# Patient Record
Sex: FEMALE | Race: BLACK OR AFRICAN AMERICAN | Employment: FULL TIME | URBAN - METROPOLITAN AREA
[De-identification: names, ages, dates, MRNs, and addresses within clinical notes are randomized per-mention and may not be internally consistent; named-entity substitution may affect disease eponyms.]

---

## 2017-03-06 ENCOUNTER — HOSPITAL ENCOUNTER (OUTPATIENT)
Dept: PHYSICAL THERAPY | Age: 47
Discharge: HOME | End: 2017-03-06
Payer: SELF-PAY

## 2017-03-06 PROCEDURE — 97161 PT EVAL LOW COMPLEX 20 MIN: CPT

## 2017-03-06 PROCEDURE — 97110 THERAPEUTIC EXERCISES: CPT
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
## 2017-03-08 ENCOUNTER — HOSPITAL ENCOUNTER (OUTPATIENT)
Dept: PHYSICAL THERAPY | Age: 47
Discharge: HOME | End: 2017-03-08
Payer: SELF-PAY

## 2017-03-08 PROCEDURE — 97110 THERAPEUTIC EXERCISES: CPT
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
## 2017-03-13 ENCOUNTER — HOSPITAL ENCOUNTER (OUTPATIENT)
Dept: PHYSICAL THERAPY | Age: 47
Discharge: HOME | End: 2017-03-13
Payer: SELF-PAY

## 2017-03-13 PROCEDURE — 97140 MANUAL THERAPY 1/> REGIONS: CPT

## 2017-03-13 PROCEDURE — 97110 THERAPEUTIC EXERCISES: CPT

## 2017-03-21 ENCOUNTER — HOSPITAL ENCOUNTER (OUTPATIENT)
Dept: PHYSICAL THERAPY | Age: 47
Discharge: HOME | End: 2017-03-21
Payer: SELF-PAY

## 2017-03-21 PROCEDURE — 97140 MANUAL THERAPY 1/> REGIONS: CPT

## 2017-03-21 PROCEDURE — 97110 THERAPEUTIC EXERCISES: CPT

## 2017-03-21 NOTE — PROGRESS NOTES
[facility]  [address], [address] [facility]    OUTPATIENT physical Therapy DAILY Treatment NOTe  Visit: 4    NAME: Georgia Vivas AGE: 40 y.o. GENDER: female  DATE: 3/21/2017  REFERRING PHYSICIAN: Brown Tuttle., *        GOALS  Short term goals  Time frame: 3 weeks  1. Patient will be compliance and independent with the initial HEP as evidenced by being able to perform without cuing. 2. Patient will report a 50% improvement in symptoms. 3. Patient report a 50% improvement in sleeping. 7. Patient will tolerate 15 minutes of clinic activities before onset of symptoms. Long term goals  Time frame: 6 weeks  1. Patient will reports pain level decreased to 2/10 to allow increased ease of movement. 2. Patient will have an improved score on the TXU Jose D outcome measure by 12 points to demonstrate an increase in functional activity tolerance. 3. Patient will be independent in final individualized HEP. 4. Patient will sleep 6-8 hours without being interrupted by pain. SUBJECTIVE:   Pt reports decreased pain, c/o of \"soreness\" neck and upper back      OBJECTIVE DATA SUMMARY:     EXAMINATION/PRESENTATION/DECISION MAKING:   Pain:  Location:Low back, b/l shoulders and neck  Quality: aching and sharp  Now:8/10  Best:5/10  Worst:  Factors that improve pain: medication:  rest lying down    Posture:   Rounded shoulders head forward    Strength:    WNL    Range of Motion:   Cx   Flexion and ext limited to 50% of normal  Rotation 75% of normal and painful    Lumbar ROM  Limited to 50% in all directions, painful    Left G-H  WNL except ER 65 degrees    Right G-H  Flexion 130 degrees  IR 45 degrees  ER 55 degrees    Spinal Assessment:   Flattened lumbar spine      Joint Mobility:   Tender with pressure over the lumbar and thoracic spinous processes    Palpation:   TTP in lumbar and thoracic paraspinals, over the SIJ b/l'ly,  UT, rhomboids and scapular muscles. Neurologic Assessment:   Tone: normal   Sensation: WNL   Reflexes: not tested    Special Tests:   +Spurling  - LE NTT    Mobility:   Transitional Movements: Antalgic    Gait: normal    Balance:  normal    Functional Measure: In compliance with CMSs Claims Based Outcome Reporting, the following G-code set was chosen for this patient based on their primary functional limitation being treated: The outcome measure chosen to determine the severity of the functional limitation was the TXU Jose D with a score of 17/24 which was correlated with the impairment scale.     ? Mobility - Walking and Moving Around:     - CURRENT STATUS: CL - 60%-79% impaired, limited or restricted    - GOAL STATUS: CI - 1%-19% impaired, limited or restricted    - D/C STATUS:  CH - 0% impaired, limited or restricted  ---------------To be determined---------------      Physical Therapy Evaluation Charge Determination   History Examination Presentation Decision-Making            Based on the above components, the patient evaluation is determined to be of the following complexity level: LOW     TREATMENT/INTERVENTION:  Modalities (Rationale): MHP low back and neck post treatment to decrease pain and muscle guarding      Therapeutic Exercises:  HEP    UT Stretch in sitting  Shoulder blade squeeze  PPT  KTC  LTR  Cane flexion exercises in supine    New HEP 3/21    Addition clinic Activities    LBE/UBE  X 5 minutes  Resistance of 3.5    Supine  KTC  Bridges x10 2 sets  T's and X's 10 of each  PPT  Chin tuck into pillow    Standing  Row with red TB x 10 reps  Corner stretch    Sitting  Flexion with blue theraball  Segmental Flexion/ ext over chair  Shoulder blade squeeze  Scapular protraction stretch    All 4's  Angry cat  UE extension  Child's pose        Manual Therapy:  Cx distraction Gr 2  Side glide, hypomobile painful,  lower Cx spine  STM SCM, B/L UT  STM rhomboids      Neuro Re-Education:  Discussed sitting with lumbar support in straight back chair    Balance Training:  none    Ambulation/Gait Training:  none    Activity tolerance and post treatment pain report: Tolerated fair, pain in left ankle    Education:  Education was provided to the patient on the following topics: Pt to visit ER or see Dr Brigette Sharma about increasing pain in left ankle. [x]    No changes were made to the home exercise program.  []    The following changes were made to the home exercise program:   Patient verbalized understanding of the topics presented. ASSESSMENT:   Pt reports decreased pain in her ankle and low back, cc is neck and UT area. .  C/o soreness in the UT and shoulder blade area b/l. Tolerated manual therapy today, hypomobile and painful with Cx side glides. Distraction ok. Progress exercises, clinic activities and manual therapy. New HEP given today to focus on neck and upper back. Patients progression toward goals is as follows:  []     Improving appropriately and progressing toward goals  [x]     Improving slowly and progressing toward goals  []     Not making progress toward goals and plan of care will be adjusted    PLAN OF CARE:   Patient continues to benefit from skilled intervention to address the above impairments. [x]    Continue treatment per established plan of care. []     Recommend the following changes to the plan of care:     Recommendations/Intent for next treatment: Progress LBE/UBE, stretching and core exercises, encourage walking program. Continue mob and strengthening of thoracic spin. Try TM Review new HEP    Alvin Portillo PT     Patient Time in clinic:

## 2017-03-30 ENCOUNTER — HOSPITAL ENCOUNTER (OUTPATIENT)
Dept: PHYSICAL THERAPY | Age: 47
Discharge: HOME | End: 2017-03-30
Payer: SELF-PAY

## 2017-03-30 PROCEDURE — 97110 THERAPEUTIC EXERCISES: CPT | Performed by: PHYSICAL THERAPIST

## 2017-03-30 PROCEDURE — 97140 MANUAL THERAPY 1/> REGIONS: CPT | Performed by: PHYSICAL THERAPIST

## 2017-03-30 NOTE — PROGRESS NOTES
[facility]  [address], [address] [facility]    OUTPATIENT physical Therapy DAILY Treatment NOTe  Visit: 5    NAME: Georgia Vivas AGE: 40 y.o. GENDER: female  DATE: 3/30/2017  REFERRING PHYSICIAN: Augusta Ochoa, *      GOALS  Short term goals  Time frame: 3 weeks  1. Patient will be compliant and independent with the initial HEP as evidenced by being able to perform without cueing. 2. Patient will report a 50% improvement in symptoms. 3. Patient report a 50% improvement in sleeping. 7. Patient will tolerate 15 minutes of clinic activities before onset of symptoms. Long term goals  Time frame: 6 weeks  1. Patient will report pain level decrease to 2/10 to allow increased ease of movement. 2. Patient will have an improved score on the TXU Jose D outcome measure by 12 points to demonstrate an increase in functional activity tolerance. 3. Patient will be independent in final individualized HEP. 4. Patient will sleep 6-8 hours without being interrupted by pain. SUBJECTIVE:   \"It hurts most when I'm trying to go to sleep. \"    Pain in: 8/10 neck and low back     OBJECTIVE DATA SUMMARY:   Objective data from initial evaluation:  EXAMINATION/PRESENTATION/DECISION MAKING:   Pain:  Location:Low back, b/l shoulders and neck  Quality: aching and sharp  Now:8/10  Best:5/10  Factors that improve pain: medication:  rest lying down    Posture:   Rounded shoulders head forward    Strength:    WNL    Range of Motion:   Cx   Flexion and ext limited to 50% of normal  Rotation 75% of normal and painful    Lumbar ROM  Limited to 50% in all directions, painful    Left G-H  WNL except ER 65 degrees    Right G-H  Flexion 130 degrees  IR 45 degrees  ER 55 degrees    Spinal Assessment:   Flattened lumbar spine    Joint Mobility:   Tender with pressure over the lumbar and thoracic spinous processes    Palpation:   TTP in lumbar and thoracic paraspinals, over the SIJ b/l'ly, UT, rhomboids and scapular muscles. Special Tests:   +Spurling  - LE NTT    Functional Measure:   Elijah [id number]    TREATMENT/INTERVENTION:  Modalities (Rationale): to decrease pain and muscle guarding  MHP low back and neck post treatment -held this session    Therapeutic Exercises:  HEP provided on evaluation:  UT Stretch, Shoulder blade squeeze. PPT  KTC. LTR. Cane flexion exercises in supine    New HEP provided on 3/21/17    Addition clinic Activities:    LBE/UBE  X 5 minutes  Resistance of 3.5    Supine  KTC  LTR  Bridges x10 2 sets  T's and X's 10 of each  PPT  Chin tuck into pillow    Standing  Row with red TB x 10 reps  Corner stretch    Sitting  UT stretch: 3 reps with 15 second holds  Scalene stretch: 3 reps with 15 second holds  Levator scapulae stretch: 3 reps with 15 second holds  Lower cervical/upper thoracic stretch (scap protraction): 3 reps with 15 second holds  Biceps/chest stretch: 3 reps with 15 second holds  Flexion with blue theraball  Segmental Flexion/ ext over chair  Shoulder blade squeeze: 15 reps    All 4's  Angry cat  UE extension  Child's pose    Manual Therapy:  Cx distraction Gr 2  Side glide, hypomobile painful,  lower Cx spine  Manual cervical traction with suboccipital release  Trigger point release to UT and rhomboids  Cervico-thoracic distraction in standing; cavitation, pain relief     Neuro Re-Education:  Discussed sitting with lumbar support in straight back chair    Activity tolerance and post treatment pain report: Tolerated fair; 7/10    Education:  Education was provided to the patient on the following topics  [x]    No changes were made to the home exercise program.  []    The following changes were made to the home exercise program:   Patient verbalized understanding of the topics presented. ASSESSMENT:   Patient continues to present with pain in neck and mid back. Tightness noted throughout UT, rhomboids, suboccipitals, scalenes, and levator scapulae. Patient reports frequent headaches. Hypomobility noted throughout cervical and upper thoracic spine. Patient tolerated cervico-thoracic distraction well with cavitation noted. Patient tolerated trigger point release to UT and rhomboids well; instructed to properly hydrate once home and apply heat. Fair tolerance to HEP. Patients progression toward goals is as follows:  []     Improving appropriately and progressing toward goals  [x]     Improving slowly and progressing toward goals  []     Not making progress toward goals and plan of care will be adjusted    PLAN OF CARE:   Patient continues to benefit from skilled intervention to address the above impairments. [x]    Continue treatment per established plan of care.   []     Recommend the following changes to the plan of care:     Recommendations/Intent for next treatment: Progress LBE/UBE, stretching and core exercises, continue mob and strengthening of thoracic spine    Jc Martinez, PT   Time Calculation: 30 mins  Patient Time in clinic:   Start Time: 2855   Stop Time: 7663

## 2017-04-04 ENCOUNTER — APPOINTMENT (OUTPATIENT)
Dept: PHYSICAL THERAPY | Age: 47
End: 2017-04-04

## 2017-04-06 ENCOUNTER — APPOINTMENT (OUTPATIENT)
Dept: PHYSICAL THERAPY | Age: 47
End: 2017-04-06

## 2017-04-06 ENCOUNTER — HOSPITAL ENCOUNTER (OUTPATIENT)
Dept: PHYSICAL THERAPY | Age: 47
Discharge: HOME | End: 2017-04-06
Payer: SELF-PAY

## 2017-04-06 PROCEDURE — 97140 MANUAL THERAPY 1/> REGIONS: CPT | Performed by: PHYSICAL THERAPIST

## 2017-04-06 PROCEDURE — 97014 ELECTRIC STIMULATION THERAPY: CPT | Performed by: PHYSICAL THERAPIST

## 2017-04-06 PROCEDURE — 97110 THERAPEUTIC EXERCISES: CPT | Performed by: PHYSICAL THERAPIST

## 2017-04-06 NOTE — PROGRESS NOTES
[facility]  [address], [address] [facility]    OUTPATIENT physical Therapy DAILY Treatment NOTe  Visit: 6    NAME: Georgia Vivas AGE: 40 y.o. GENDER: female  DATE: 4/6/2017  REFERRING PHYSICIAN: Usman Loving., *      GOALS  Short term goals  Time frame: 3 weeks  1. Patient will be compliant and independent with the initial HEP as evidenced by being able to perform without cueing. MET  2. Patient will report a 50% improvement in symptoms. 3. Patient report a 50% improvement in sleeping. 7. Patient will tolerate 15 minutes of clinic activities before onset of symptoms. Long term goals  Time frame: 6 weeks  1. Patient will report pain level decrease to 2/10 to allow increased ease of movement. 2. Patient will have an improved score on the TXU Jose D outcome measure by 12 points to demonstrate an increase in functional activity tolerance. 3. Patient will be independent in final individualized HEP. 4. Patient will sleep 6-8 hours without being interrupted by pain. SUBJECTIVE:   \"It feels a little better than it did. \"    Pain in: 7/10 neck and low back     OBJECTIVE DATA SUMMARY:   Objective data from initial evaluation:  EXAMINATION/PRESENTATION/DECISION MAKING:   Pain:  Location:Low back, b/l shoulders and neck  Quality: aching and sharp  Now:8/10  Best:5/10  Factors that improve pain: medication:  rest lying down    Posture:   Rounded shoulders head forward    Strength:    WNL    Range of Motion:   Cx   Flexion and ext limited to 50% of normal  Rotation 75% of normal and painful    Lumbar ROM  Limited to 50% in all directions, painful    Left G-H  WNL except ER 65 degrees    Right G-H  Flexion 130 degrees  IR 45 degrees  ER 55 degrees    Spinal Assessment:   Flattened lumbar spine    Joint Mobility:   Tender with pressure over the lumbar and thoracic spinous processes    Palpation:   TTP in lumbar and thoracic paraspinals, over the SIJ b/l'ly,  UT, rhomboids and scapular muscles. Special Tests:   +Spurling  - LE NTT    Functional Measure:   Rella Katos: 17/24    TREATMENT/INTERVENTION:  Modalities (Rationale): to decrease pain and muscle guarding  MHP and IFC e stim to UT and rhomboids in supine for 15 minutes post treatment     Therapeutic Exercises:  HEP provided on evaluation:  UT Stretch, Shoulder blade squeeze. PPT  KTC. LTR. Cane flexion exercises in supine    New HEP provided on 3/21/17    Addition clinic Activities:    LBE/UBE  X 5 minutes  Resistance of 3.5    Supine  KTC  LTR  Bridges x10 2 sets  T's and X's 10 of each  PPT  Chin tuck into pillow    Standing  Rows at Quantum 25#:  2 sets of 12 reps  Pull downs at Quantum 20#: 2 sets of 12 rep  Corner stretch: 4 reps with 15 second holds    Sitting  UT stretch: 3 reps with 15 second holds  Scalene stretch: 3 reps with 15 second holds  Levator scapulae stretch: 3 reps with 15 second holds  Lower cervical/upper thoracic stretch (scap protraction): 3 reps with 15 second holds  Biceps/chest stretch: 3 reps with 15 second holds  Flexion with blue theraball  Segmental Flexion/ ext over chair  Shoulder shrugs: 20 reps    All 4's  Angry cat  UE extension  Child's pose    Manual Therapy:  Cx distraction Gr 2  Side glide, hypomobile painful,  lower Cx spine  Manual cervical traction with suboccipital release  Trigger point release to UT and rhomboids  Cervico-thoracic distraction in standing; pain relief     Neuro Re-Education:  Discussed sitting with lumbar support in straight back chair    Activity tolerance and post treatment pain report:   Good: 5/10    Education:  Education was provided to the patient on the following topics  [x]    No changes were made to the home exercise program.  []    The following changes were made to the home exercise program:   Patient verbalized understanding of the topics presented. ASSESSMENT:   Patient presents with decreased cervical spine pain this date.  Tightness noted throughout UT, rhomboids, suboccipitals, scalenes, and levator scapulae. Patient reports about one headache per week; noting decrease in frequency and intensity. Hypomobility noted throughout cervical and upper thoracic spine. Patient tolerated trigger point release to UT and rhomboids well. Focused placed on cervical and upper thoracic spine this date as patient reports that is where majority of pain is located. Patients progression toward goals is as follows:  []     Improving appropriately and progressing toward goals  [x]     Improving slowly and progressing toward goals  []     Not making progress toward goals and plan of care will be adjusted    PLAN OF CARE:   Patient continues to benefit from skilled intervention to address the above impairments. [x]    Continue treatment per established plan of care.   []     Recommend the following changes to the plan of care:     Recommendations/Intent for next treatment: Progress LBE/UBE, stretching and core exercises, continue mob and strengthening of thoracic spine    Everette Catalan PT   Time Calculation: 58 mins  Patient Time in clinic:   Start Time: 0903   Stop Time: 1001

## 2017-04-13 ENCOUNTER — HOSPITAL ENCOUNTER (OUTPATIENT)
Dept: PHYSICAL THERAPY | Age: 47
Discharge: HOME | End: 2017-04-13
Payer: SELF-PAY

## 2017-04-13 NOTE — PROGRESS NOTES
[facility]  [address], [address] [facility] ([place], [place])    OUTPATIENT physical Therapy      4/13/2017:  Lorenzo Jean-Baptiste was not seen on this date for physical therapy for the following reasons:    [x]     Patient called to cancel the visit for the following reasons: allergies   []     Patient missed the visit and did not call to cancel.     Maldonado Plaza, PT

## 2017-04-18 ENCOUNTER — HOSPITAL ENCOUNTER (OUTPATIENT)
Dept: PHYSICAL THERAPY | Age: 47
Discharge: HOME | End: 2017-04-18
Payer: SELF-PAY

## 2017-04-18 NOTE — PROGRESS NOTES
[facility]  [address], [address] [facility]    OUTPATIENT physical Therapy      4/18/2017:  Conner Wilson was not seen on this date for physical therapy for the following reasons:    [x]     Patient called to cancel the visit for the following reasons: patient moving; rescheduled for next week   []     Patient missed the visit and did not call to cancel.     Margarette Vallecillo, PT

## 2017-05-10 ENCOUNTER — HOSPITAL ENCOUNTER (OUTPATIENT)
Dept: PHYSICAL THERAPY | Age: 47
Discharge: HOME | End: 2017-05-10

## 2017-05-10 NOTE — PROGRESS NOTES
[facility]  [address], [address] [facility]    OUTPATIENT physical Therapy discharge note      5/10/2017:  Patient will be discharged from physical therapy at this time. Criteria for termination of care:    []           Patient has plateaued  [x]           Patient has not returned to therapy  []           Patient has missed three or more visits without prior notification  [x]           Other: Patient called to cancel session today. Patient requested discharged from physical therapy. Patient was seen for 6 visits from 3/6/17 to 4/6/17. Please refer to the most recent progress note for the latest PT info available. If you need anything further faxed to you, please contact us at [phone].     Thank you for this referral.  Yesy Meza, PT

## 2021-09-09 ENCOUNTER — HOSPITAL ENCOUNTER (EMERGENCY)
Age: 51
Discharge: HOME | End: 2021-09-09
Attending: EMERGENCY MEDICINE

## 2021-09-09 VITALS
SYSTOLIC BLOOD PRESSURE: 121 MMHG | WEIGHT: 117.5 LBS | DIASTOLIC BLOOD PRESSURE: 97 MMHG | OXYGEN SATURATION: 98 % | HEIGHT: 63 IN | HEART RATE: 94 BPM | TEMPERATURE: 97.7 F | RESPIRATION RATE: 20 BRPM | BODY MASS INDEX: 20.82 KG/M2
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
## 2021-09-09 DIAGNOSIS — S20.211A CONTUSION OF RIB ON RIGHT SIDE, INITIAL ENCOUNTER: Primary | ICD-10-CM

## 2021-09-09 PROCEDURE — 99282 EMERGENCY DEPT VISIT SF MDM: CPT
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
## 2021-09-09 NOTE — ED NOTES
Emergency Department Nursing Plan of Care       The Nursing Plan of Care is developed from the Nursing assessment and Emergency Department Attending provider initial evaluation. The plan of care may be reviewed in the ED Provider note.     The Plan of Care was developed with the following considerations:   Patient / Family readiness to learn indicated by:verbalized understanding  Persons(s) to be included in education: patient  Barriers to Learning/Limitations:No    [address] [facility] [place]    9/9/2021   4:49 PM
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
## 2021-10-28 ENCOUNTER — HOSPITAL ENCOUNTER (EMERGENCY)
Age: 51
Discharge: HOME | End: 2021-10-28
Attending: EMERGENCY MEDICINE

## 2021-10-28 VITALS
WEIGHT: 120 LBS | BODY MASS INDEX: 21.26 KG/M2 | RESPIRATION RATE: 18 BRPM | HEIGHT: 63 IN | OXYGEN SATURATION: 99 % | DIASTOLIC BLOOD PRESSURE: 92 MMHG | TEMPERATURE: 98.2 F | HEART RATE: 60 BPM | SYSTOLIC BLOOD PRESSURE: 145 MMHG

## 2021-10-28 DIAGNOSIS — L42 PITYRIASIS ROSEA: Primary | ICD-10-CM

## 2021-10-28 DIAGNOSIS — R03.0 ELEVATED BLOOD PRESSURE READING: ICD-10-CM

## 2021-10-28 PROCEDURE — 74011250637 HC RX REV CODE- 250/637: Performed by: PHYSICIAN ASSISTANT

## 2021-10-28 PROCEDURE — 99283 EMERGENCY DEPT VISIT LOW MDM: CPT

## 2021-10-28 RX ORDER — DIPHENHYDRAMINE HCL 25 MG
50 CAPSULE ORAL
Status: COMPLETED | OUTPATIENT
Start: 2021-10-28 | End: 2021-10-28

## 2021-10-28 RX ORDER — DIPHENHYDRAMINE HCL 25 MG
50 CAPSULE ORAL
Qty: 20 CAPSULE | Refills: 0 | Status: SHIPPED | OUTPATIENT
Start: 2021-10-28 | End: 2021-11-07

## 2021-10-28 RX ORDER — TRIAMCINOLONE ACETONIDE 1 MG/ML
LOTION TOPICAL 3 TIMES DAILY
Qty: 60 ML | Refills: 0 | Status: SHIPPED | OUTPATIENT
Start: 2021-10-28

## 2021-10-28 RX ADMIN — DIPHENHYDRAMINE HYDROCHLORIDE 50 MG: 25 CAPSULE ORAL at 10:25

## 2021-10-28 NOTE — ED PROVIDER NOTES
EMERGENCY DEPARTMENT HISTORY AND PHYSICAL EXAM      Date: 10/28/2021  Patient Name: Shelley Alvarado    History of Presenting Illness     Chief Complaint   Patient presents with    Skin Problem     History Provided By: Patient    HPI: Shelley Alvarado, 49 y.o. female with no chronic medical history who presents via self to the ED with cc of acute moderate pruritic rash to generalized body X 1 month. Endorses symptoms started after she moved into a new house and she was concerned about mold exposure. She denies any fever, chills, nausea, vomiting, numbness, tingling, focal weakness, chest pain, shortness of breath, wheezing, cough, headaches, lightheadedness, dizziness. No medications or modifying factors. Denies any new medications or other new environmental exposures. No new cosmetics, detergents, lotions. No recent sick contacts or recent URI symptoms. PCP: None    There are no other complaints, changes, or physical findings at this time. No current facility-administered medications on file prior to encounter. No current outpatient medications on file prior to encounter. Past History     Past Medical History:  History reviewed. No pertinent past medical history. Past Surgical History:  History reviewed. No pertinent surgical history. Family History:  History reviewed. No pertinent family history. Social History:  Social History     Tobacco Use    Smoking status: Never Smoker    Smokeless tobacco: Never Used   Substance Use Topics    Alcohol use: Never    Drug use: Never     Allergies:  No Known Allergies  Review of Systems   Review of Systems   Constitutional: Negative. Negative for activity change, chills, fatigue and fever. HENT: Negative. Negative for congestion, ear pain, rhinorrhea and sore throat. Eyes: Negative. Negative for pain. Respiratory: Negative. Negative for cough, chest tightness, shortness of breath and wheezing. Cardiovascular: Negative. Negative for chest pain, palpitations and leg swelling. Gastrointestinal: Negative. Negative for abdominal pain, diarrhea, nausea and vomiting. Genitourinary: Negative. Negative for difficulty urinating and dysuria. Musculoskeletal: Negative. Negative for arthralgias and joint swelling. Skin: Positive for rash. Negative for wound. Neurological: Negative. Negative for weakness and headaches. Psychiatric/Behavioral: Negative. Negative for confusion. Physical Exam   Physical Exam  Vitals and nursing note reviewed. Constitutional:       General: She is not in acute distress. Appearance: Normal appearance. She is well-developed. She is not ill-appearing, toxic-appearing or diaphoretic. HENT:      Head: Normocephalic and atraumatic. Right Ear: Hearing and external ear normal.      Left Ear: Hearing and external ear normal.      Nose: Nose normal.   Eyes:      Conjunctiva/sclera: Conjunctivae normal.      Pupils: Pupils are equal, round, and reactive to light. Cardiovascular:      Rate and Rhythm: Normal rate and regular rhythm. Pulmonary:      Effort: Pulmonary effort is normal. No respiratory distress. Breath sounds: Normal breath sounds. Musculoskeletal:         General: Normal range of motion. Cervical back: Normal range of motion. Skin:     General: Skin is warm and dry. Findings: Rash present. No abscess or erythema. Rash is macular. Rash is not crusting, nodular, papular, pustular, scaling, urticarial or vesicular. Comments: Intermittent 1 to 2 cm dry macule rash noted to trunk and bilateral extremities. No palm/webspace involvement or papular rash. Neurological:      Mental Status: She is alert and oriented to person, place, and time. Psychiatric:         Mood and Affect: Mood is anxious. Behavior: Behavior normal.         Thought Content:  Thought content normal.         Judgment: Judgment normal.       Diagnostic Study Results   Labs -   No results found for this or any previous visit (from the past 12 hour(s)). Radiologic Studies -   No orders to display     No results found. Medical Decision Making   I am the first provider for this patient. I reviewed the vital signs, available nursing notes, past medical history, past surgical history, family history and social history. Vital Signs-Reviewed the patient's vital signs. Patient Vitals for the past 24 hrs:   Temp Pulse Resp BP SpO2   10/28/21 1021 -- -- -- -- 99 %   10/28/21 1019 -- 60 -- (!) 145/92 --   10/28/21 1013 98.2 °F (36.8 °C) 62 18 (!) 182/95 100 %     Pulse Oximetry Analysis - 99% on RA (normal)    Records Reviewed: Nursing Notes, Old Medical Records, Previous Radiology Studies and Previous Laboratory Studies    Provider Notes (Medical Decision Making):   Patient presents with rash. DDx: viral exanthem, pityriasis rosea, allergic reaction, contact dermatitis, staph infection, scabies, bedbugs. Will treat with benadryl, topical steroids. ED Course:   Initial assessment performed. The patients presenting problems have been discussed, and they are in agreement with the care plan formulated and outlined with them. I have encouraged them to ask questions as they arise throughout their visit. Progress Note:   Updated pt on all returned results and findings. Discussed the importance of proper follow up as referred below along with return precautions. Pt in agreement with the care plan and expresses agreement with and understanding of all items discussed. Disposition:  10:33 AM  I have discussed with patient their diagnosis, treatment, and follow up plan. The patient agrees to follow up as outlined in discharge paperwork and also to return to the ED with any worsening. Elinor Lam PA-C      PLAN:  1.    Current Discharge Medication List      START taking these medications    Details   diphenhydrAMINE (BenadryL) 25 mg capsule Take 2 Capsules by mouth every six (6) hours as needed for Itching or Skin Irritation for up to 10 days. Qty: 20 Capsule, Refills: 0  Start date: 10/28/2021, End date: 11/7/2021      triamcinolone (KENALOG) 0.1 % lotion Apply  to affected area three (3) times daily. use thin layer  Qty: 60 mL, Refills: 0  Start date: 10/28/2021           2. Follow-up Information     Follow up With Specialties Details Why [address] EMERGENCY DEPT Emergency Medicine Go to  As needed, If symptoms worsen [address] [facility]  Schedule an appointment as soon as possible for a visit  As needed, If symptoms worsen [place]  [phone]        Return to ED if worse     Diagnosis     Clinical Impression:   1. Pityriasis rosea    2. Elevated blood pressure reading            Please note that this dictation was completed with Dragon, computer voice recognition software. Quite often unanticipated grammatical, syntax, homophones, and other interpretive errors are inadvertently transcribed by the computer software. Please disregard these errors. Additionally, please excuse any errors that have escaped final proofreading.

## 2021-10-28 NOTE — DISCHARGE INSTRUCTIONS
It was a pleasure taking care of you at [facility] Emergency Department today. We know that when you come to [facility], you are entrusting us with your health, comfort, and safety. Our physicians and nurses honor that trust, and we truly appreciate the opportunity to care for you and your loved ones. We also value our feedback. If you receive a survey about your Emergency Department experience today, please fill it out. We care about our patients' feedback, and we listen to what you have to say. Thank you!

## 2021-10-28 NOTE — ED TRIAGE NOTES
Patient presents to the ED with c/o skin problem x3 months. Pt reports mold and living in an apartment and is unsure what is causing this rash to her back and legs. Pt reports itching.
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
## 2022-04-10 ENCOUNTER — HOSPITAL ENCOUNTER (EMERGENCY)
Age: 52
Discharge: HOME | End: 2022-04-10
Attending: EMERGENCY MEDICINE

## 2022-04-10 VITALS
OXYGEN SATURATION: 100 % | HEART RATE: 61 BPM | HEIGHT: 63 IN | TEMPERATURE: 98.3 F | RESPIRATION RATE: 18 BRPM | SYSTOLIC BLOOD PRESSURE: 125 MMHG | WEIGHT: 120 LBS | BODY MASS INDEX: 21.26 KG/M2 | DIASTOLIC BLOOD PRESSURE: 64 MMHG

## 2022-04-10 DIAGNOSIS — N92.0 MENORRHAGIA WITH REGULAR CYCLE: ICD-10-CM

## 2022-04-10 DIAGNOSIS — J30.89 ENVIRONMENTAL AND SEASONAL ALLERGIES: Primary | ICD-10-CM

## 2022-04-10 PROCEDURE — 99282 EMERGENCY DEPT VISIT SF MDM: CPT

## 2022-04-10 NOTE — ED NOTES
Pt given discharge and follow up instructions. No further questions at this time. Pt advised to follow with PCP.

## 2022-04-10 NOTE — ED PROVIDER NOTES
HPI   Patient reports 1 to 2 days of seasonal allergy symptoms. These include rhinorrhea, sneezing, and sinus pain and pressure. She also reports history of uterine fibroids and is on her period and is experiencing \"clogs. \"  No pelvic pain, syncope, fever. No past medical history on file. No past surgical history on file. No family history on file. Social History     Socioeconomic History    Marital status: SINGLE     Spouse name: Not on file    Number of children: Not on file    Years of education: Not on file    Highest education level: Not on file   Occupational History    Not on file   Tobacco Use    Smoking status: Never Smoker    Smokeless tobacco: Never Used   Substance and Sexual Activity    Alcohol use: Never    Drug use: Never    Sexual activity: Never   Other Topics Concern    Not on file   Social History Narrative    Not on file     Social Determinants of Health     Financial Resource Strain:     Difficulty of Paying Living Expenses: Not on file   Food Insecurity:     Worried About Running Out of Food in the Last Year: Not on file    Kavita of Food in the Last Year: Not on file   Transportation Needs:     Lack of Transportation (Medical): Not on file    Lack of Transportation (Non-Medical):  Not on file   Physical Activity:     Days of Exercise per Week: Not on file    Minutes of Exercise per Session: Not on file   Stress:     Feeling of Stress : Not on file   Social Connections:     Frequency of Communication with Friends and Family: Not on file    Frequency of Social Gatherings with Friends and Family: Not on file    Attends Oriental orthodox Services: Not on file    Active Member of Clubs or Organizations: Not on file    Attends Club or Organization Meetings: Not on file    Marital Status: Not on file   Intimate Partner Violence:     Fear of Current or Ex-Partner: Not on file    Emotionally Abused: Not on file    Physically Abused: Not on file    Sexually Abused: Not on file   Housing Stability:     Unable to Pay for Housing in the Last Year: Not on file    Number of Places Lived in the Last Year: Not on file    Unstable Housing in the Last Year: Not on file         ALLERGIES: Patient has no known allergies. Review of Systems   Constitutional: Negative. HENT: Positive for congestion, postnasal drip, rhinorrhea, sinus pressure, sinus pain and sneezing. Eyes: Positive for itching. Respiratory: Negative. Cardiovascular: Negative. Gastrointestinal: Negative. Endocrine: Negative. Genitourinary: Positive for vaginal bleeding. Musculoskeletal: Negative. Allergic/Immunologic: Negative. Neurological: Negative. Hematological: Negative. Psychiatric/Behavioral: Negative. All other systems reviewed and are negative. Vitals:    04/10/22 0917   BP: 125/64   Pulse: 61   Resp: 18   Temp: 98.3 °F (36.8 °C)   SpO2: 100%   Weight: 54.4 kg (120 lb)   Height: 5' 3\" (1.6 m)            Physical Exam  Vitals and nursing note reviewed. Constitutional:       General: She is not in acute distress. Appearance: Normal appearance. She is normal weight. She is not ill-appearing, toxic-appearing or diaphoretic. HENT:      Head: Normocephalic and atraumatic. Nose: Congestion and rhinorrhea present. Mouth/Throat:      Mouth: Mucous membranes are moist.      Pharynx: Oropharynx is clear. No oropharyngeal exudate or posterior oropharyngeal erythema. Eyes:      Extraocular Movements: Extraocular movements intact. Conjunctiva/sclera: Conjunctivae normal.      Pupils: Pupils are equal, round, and reactive to light. Cardiovascular:      Pulses: Normal pulses. Heart sounds: No murmur heard. Pulmonary:      Effort: Pulmonary effort is normal. No respiratory distress. Breath sounds: Normal breath sounds. No stridor. No wheezing, rhonchi or rales.    Musculoskeletal:         General: No swelling, tenderness, deformity or signs of injury. Normal range of motion. Cervical back: Normal range of motion. No rigidity or tenderness. Right lower leg: No edema. Left lower leg: No edema. Lymphadenopathy:      Cervical: No cervical adenopathy. Skin:     General: Skin is warm and dry. Capillary Refill: Capillary refill takes less than 2 seconds. Coloration: Skin is not jaundiced or pale. Findings: No bruising, erythema or lesion. Neurological:      General: No focal deficit present. Mental Status: She is alert and oriented to person, place, and time. Mental status is at baseline. Cranial Nerves: No cranial nerve deficit. Sensory: No sensory deficit. Motor: No weakness. Coordination: Coordination normal.      Gait: Gait normal.          MDM     Patient with seasonal allergies and/or common cold, and menses. She requests a work note for these. Okay for discharge.   Procedures

## 2022-04-10 NOTE — Clinical Note
[address]  [facility] EMERGENCY DEPT  [place] [address]  [phone]    Work/School Note    Date: 4/10/2022    To Whom It May concern:      Autumn Rodriguez was seen and treated today in the emergency room by the following provider(s):  Attending Provider: Moses Oliveira MD.      Autumn Rordiguez is excused from work/school on 04/10/22. She is clear to return to work/school on 04/11/22. Pt requests to be off work for runny nose and menstrual period.     Sincerely,          Theodore Peñaloza MD

## 2022-04-10 NOTE — ED TRIAGE NOTES
Pt reports seasonal allergies with skin itching, watery eyes, nasal congestion. Pt also reports that yesterday she had a few blood clots vaginally. Pt reports mensuration cycle is regular.

## 2022-04-27 ENCOUNTER — HOSPITAL ENCOUNTER (EMERGENCY)
Age: 52
Discharge: HOME | End: 2022-04-27
Attending: EMERGENCY MEDICINE

## 2022-04-27 VITALS
RESPIRATION RATE: 19 BRPM | TEMPERATURE: 98.4 F | SYSTOLIC BLOOD PRESSURE: 102 MMHG | WEIGHT: 120 LBS | BODY MASS INDEX: 21.26 KG/M2 | DIASTOLIC BLOOD PRESSURE: 47 MMHG | HEART RATE: 52 BPM | OXYGEN SATURATION: 99 % | HEIGHT: 63 IN

## 2022-04-27 DIAGNOSIS — J01.90 ACUTE SINUSITIS, RECURRENCE NOT SPECIFIED, UNSPECIFIED LOCATION: Primary | ICD-10-CM

## 2022-04-27 PROCEDURE — 99283 EMERGENCY DEPT VISIT LOW MDM: CPT

## 2022-04-27 RX ORDER — FLUTICASONE PROPIONATE 50 MCG
2 SPRAY, SUSPENSION (ML) NASAL DAILY
Qty: 1 EACH | Refills: 1 | Status: SHIPPED | OUTPATIENT
Start: 2022-04-27

## 2022-04-27 NOTE — Clinical Note
[address] [facility] EMERGENCY DEPT  [place] [address] [address]  [phone]    Work/School Note    Date: 4/27/2022    To Whom It May concern:      Heriberto Victor was seen and treated today in the emergency room by the following provider(s):  Attending Provider: Mariza Saldana MD.      Heriberto Victor is excused from work/school on 04/27/22. She is clear to return to work/school on 04/28/22.         Sincerely,          Placido Galeazzi

## 2022-04-27 NOTE — ED PROVIDER NOTES
HPI 60-year-old female presents ED complaining of sinusitis symptoms due to tree pollen beginning approximate week ago now with increasing congestion and clear nasal discharge. Symptoms began 1 week ago no fever or sinus pain. No past medical history on file. No past surgical history on file. No family history on file. Social History     Socioeconomic History    Marital status: SINGLE     Spouse name: Not on file    Number of children: Not on file    Years of education: Not on file    Highest education level: Not on file   Occupational History    Not on file   Tobacco Use    Smoking status: Never Smoker    Smokeless tobacco: Never Used   Substance and Sexual Activity    Alcohol use: Never    Drug use: Never    Sexual activity: Never   Other Topics Concern    Not on file   Social History Narrative    Not on file     Social Determinants of Health     Financial Resource Strain:     Difficulty of Paying Living Expenses: Not on file   Food Insecurity:     Worried About Running Out of Food in the Last Year: Not on file    Kavita of Food in the Last Year: Not on file   Transportation Needs:     Lack of Transportation (Medical): Not on file    Lack of Transportation (Non-Medical):  Not on file   Physical Activity:     Days of Exercise per Week: Not on file    Minutes of Exercise per Session: Not on file   Stress:     Feeling of Stress : Not on file   Social Connections:     Frequency of Communication with Friends and Family: Not on file    Frequency of Social Gatherings with Friends and Family: Not on file    Attends Amish Services: Not on file    Active Member of Clubs or Organizations: Not on file    Attends Club or Organization Meetings: Not on file    Marital Status: Not on file   Intimate Partner Violence:     Fear of Current or Ex-Partner: Not on file    Emotionally Abused: Not on file    Physically Abused: Not on file    Sexually Abused: Not on file   Housing Stability:     Unable to Pay for Housing in the Last Year: Not on file    Number of Places Lived in the Last Year: Not on file    Unstable Housing in the Last Year: Not on file         ALLERGIES: Patient has no known allergies. Review of Systems   All other systems reviewed and are negative. Vitals:    04/27/22 1202   BP: (!) 102/47   Pulse: (!) 52   Resp: 19   Temp: 98.4 °F (36.9 °C)   SpO2: 99%   Weight: 54.4 kg (120 lb)   Height: 5' 3\" (1.6 m)            Physical Exam  Constitutional:       General: She is not in acute distress. Appearance: Normal appearance. She is normal weight. She is not ill-appearing or toxic-appearing. HENT:      Head: Normocephalic and atraumatic. Right Ear: Tympanic membrane normal.      Nose: Congestion present. No rhinorrhea. Comments: No sinus tenderness     Mouth/Throat:      Mouth: Mucous membranes are moist.      Pharynx: No oropharyngeal exudate. Eyes:      Extraocular Movements: Extraocular movements intact. Conjunctiva/sclera: Conjunctivae normal.   Pulmonary:      Effort: Pulmonary effort is normal.      Breath sounds: Normal breath sounds. No wheezing or rhonchi. Musculoskeletal:      Cervical back: Normal range of motion and neck supple. No rigidity or tenderness. Lymphadenopathy:      Cervical: No cervical adenopathy. Neurological:      General: No focal deficit present. Mental Status: She is alert and oriented to person, place, and time.    Psychiatric:         Mood and Affect: Mood normal.         Behavior: Behavior normal.          MDM   Seasonal sinusitis will treat with Flonase and Afrin    Procedures

## 2022-04-27 NOTE — Clinical Note
[address]  [facility] EMERGENCY DEPT  [place] [address] [address]  [phone]    Work/School Note    Date: 4/27/2022    To Whom It May concern:      Yehuda Austin was seen and treated today in the emergency room by the following provider(s):  Attending Provider: Isamar Jarrett MD.      Yehuda Austin is excused from work/school on 04/27/22. She is clear to return to work/school on 04/28/22.         Sincerely,          Sharri Beard MD

## 2022-04-27 NOTE — ED TRIAGE NOTES
Patient reports that she missed with today & yesterday due to her allergies. Reports taking Claritin with no relief.

## 2022-07-09 ENCOUNTER — HOSPITAL ENCOUNTER (EMERGENCY)
Age: 52
Discharge: HOME | End: 2022-07-09
Attending: EMERGENCY MEDICINE | Admitting: EMERGENCY MEDICINE

## 2022-07-09 VITALS
SYSTOLIC BLOOD PRESSURE: 114 MMHG | OXYGEN SATURATION: 100 % | WEIGHT: 120 LBS | RESPIRATION RATE: 18 BRPM | TEMPERATURE: 98.7 F | HEIGHT: 63 IN | HEART RATE: 59 BPM | DIASTOLIC BLOOD PRESSURE: 67 MMHG | BODY MASS INDEX: 21.26 KG/M2

## 2022-07-09 DIAGNOSIS — T20.27XA PARTIAL THICKNESS BURN OF NECK, INITIAL ENCOUNTER: Primary | ICD-10-CM

## 2022-07-09 PROCEDURE — 99283 EMERGENCY DEPT VISIT LOW MDM: CPT

## 2022-07-09 PROCEDURE — 16000 INITIAL TREATMENT OF BURN(S): CPT

## 2022-07-09 PROCEDURE — 74011000250 HC RX REV CODE- 250

## 2022-07-09 RX ORDER — SILVER SULFADIAZINE 10 G/1000G
CREAM TOPICAL DAILY
Status: DISCONTINUED | OUTPATIENT
Start: 2022-07-10 | End: 2022-07-09 | Stop reason: HOSPADM

## 2022-07-09 RX ORDER — SILVER SULFADIAZINE 10 G/1000G
CREAM TOPICAL
Status: COMPLETED
Start: 2022-07-09 | End: 2022-07-09

## 2022-07-09 RX ADMIN — SILVER SULFADIAZINE: 10 CREAM TOPICAL at 16:18

## 2022-07-09 NOTE — Clinical Note
[address]  [facility] EMERGENCY DEPT  [address] [facility] [address]  [place] [id number] [phone]    Work/School Note    Date: 7/9/2022    To Whom It May concern:    Lazarus Dover was seen and treated today in the emergency room by the following provider(s):  Attending Provider: Ebony Balderas MD.      Lazarus Dover is excused from work/school on 7/9/2022 through 7/11/2022. She is medically clear to return to work/school on 7/12/2022.          Sincerely,          Jayde De Santiago RN

## 2022-07-09 NOTE — ED NOTES
APPLIED SILVER CREAM MEDICATION; NONADHERENT DRESSING W/ PAPER TAPE.      PROVIDED EDUCATION ON WOUND CARE

## 2022-07-09 NOTE — Clinical Note
[address]  [facility] EMERGENCY DEPT  [address] [facility] [address]  [place] [address]  [phone]    Work/School Note    Date: 7/9/2022    To Whom It May concern:    Lj Adkins was seen and treated today in the emergency room by the following provider(s):  Attending Provider: Jo Proctor MD.      Lj Adkins is excused from work/school on 7/9/2022 through 7/11/2022. She is medically clear to return to work/school on 7/12/2022.          Sincerely,          Kathrin Plaza MD
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
## 2022-07-09 NOTE — Clinical Note
[address]  [facility] EMERGENCY DEPT  [address] [address]  [place] [address] [phone]    Work/School Note    Date: 7/9/2022    To Whom It May concern:    Lazarus Dover was seen and treated today in the emergency room by the following provider(s):  Attending Provider: Ebony Balderas MD.      Lazarus Dover is excused from work/school on 7/9/2022 through 7/11/2022. She is medically clear to return to work/school on 7/12/2022.          Sincerely,          Bella Barker MD
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
## 2022-07-11 ENCOUNTER — HOSPITAL ENCOUNTER (EMERGENCY)
Age: 52
Discharge: HOME | End: 2022-07-11
Attending: EMERGENCY MEDICINE

## 2022-07-11 VITALS
RESPIRATION RATE: 18 BRPM | DIASTOLIC BLOOD PRESSURE: 80 MMHG | OXYGEN SATURATION: 100 % | SYSTOLIC BLOOD PRESSURE: 138 MMHG | WEIGHT: 120 LBS | BODY MASS INDEX: 21.26 KG/M2 | HEART RATE: 62 BPM | TEMPERATURE: 98.2 F | HEIGHT: 63 IN

## 2022-07-11 DIAGNOSIS — T22.252D PARTIAL THICKNESS BURN OF LEFT SHOULDER, SUBSEQUENT ENCOUNTER: Primary | ICD-10-CM

## 2022-07-11 PROCEDURE — 74011000250 HC RX REV CODE- 250: Performed by: EMERGENCY MEDICINE

## 2022-07-11 PROCEDURE — 75810000275 HC EMERGENCY DEPT VISIT NO LEVEL OF CARE

## 2022-07-11 RX ORDER — SILVER SULFADIAZINE 10 G/1000G
CREAM TOPICAL
Status: COMPLETED | OUTPATIENT
Start: 2022-07-11 | End: 2022-07-11

## 2022-07-11 RX ORDER — SILVER SULFADIAZINE 10 G/1000G
CREAM TOPICAL 2 TIMES DAILY
Qty: 50 G | Refills: 0 | Status: SHIPPED | OUTPATIENT
Start: 2022-07-11 | End: 2022-07-21

## 2022-07-11 RX ADMIN — SILVER SULFADIAZINE: 10 CREAM TOPICAL at 13:51
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
## 2024-01-10 ENCOUNTER — OFFICE VISIT (OUTPATIENT)
Facility: CLINIC | Age: 54
End: 2024-01-10
Payer: MEDICAID

## 2024-01-10 DIAGNOSIS — J30.2 SEASONAL ALLERGIES: ICD-10-CM

## 2024-01-10 DIAGNOSIS — Z11.4 ENCOUNTER FOR SCREENING FOR HIV: ICD-10-CM

## 2024-01-10 DIAGNOSIS — C50.811 MALIGNANT NEOPLASM OF OVERLAPPING SITES OF RIGHT BREAST IN FEMALE, ESTROGEN RECEPTOR POSITIVE (HCC): Primary | ICD-10-CM

## 2024-01-10 DIAGNOSIS — Z17.0 MALIGNANT NEOPLASM OF OVERLAPPING SITES OF RIGHT BREAST IN FEMALE, ESTROGEN RECEPTOR POSITIVE (HCC): Primary | ICD-10-CM

## 2024-01-10 DIAGNOSIS — Z00.00 WELL ADULT EXAM: ICD-10-CM

## 2024-01-10 DIAGNOSIS — Z11.59 ENCOUNTER FOR HEPATITIS C SCREENING TEST FOR LOW RISK PATIENT: ICD-10-CM

## 2024-01-10 PROBLEM — R52 PAINFUL SCAR: Status: ACTIVE | Noted: 2022-08-22

## 2024-01-10 PROBLEM — L90.5 PAINFUL SCAR: Status: ACTIVE | Noted: 2022-08-22

## 2024-01-10 PROBLEM — L81.9 DYSCHROMIA: Status: ACTIVE | Noted: 2022-08-22

## 2024-01-10 PROBLEM — I42.9 CARDIOMYOPATHY (HCC): Status: ACTIVE | Noted: 2024-01-10

## 2024-01-10 PROCEDURE — 99204 OFFICE O/P NEW MOD 45 MIN: CPT

## 2024-01-10 RX ORDER — FLUTICASONE PROPIONATE 50 MCG
2 SPRAY, SUSPENSION (ML) NASAL DAILY
Qty: 16 G | Refills: 2 | Status: SHIPPED | OUTPATIENT
Start: 2024-01-10

## 2024-01-10 RX ORDER — HYDROXYZINE HYDROCHLORIDE 25 MG/1
25 TABLET, FILM COATED ORAL EVERY 6 HOURS PRN
Qty: 90 TABLET | Refills: 0 | Status: SHIPPED | OUTPATIENT
Start: 2024-01-10

## 2024-01-10 ASSESSMENT — PATIENT HEALTH QUESTIONNAIRE - PHQ9
1. LITTLE INTEREST OR PLEASURE IN DOING THINGS: 0
SUM OF ALL RESPONSES TO PHQ QUESTIONS 1-9: 1
2. FEELING DOWN, DEPRESSED OR HOPELESS: 1
SUM OF ALL RESPONSES TO PHQ9 QUESTIONS 1 & 2: 1
SUM OF ALL RESPONSES TO PHQ QUESTIONS 1-9: 1
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
## 2024-01-11 VITALS
WEIGHT: 138.13 LBS | HEIGHT: 63 IN | HEART RATE: 86 BPM | SYSTOLIC BLOOD PRESSURE: 130 MMHG | RESPIRATION RATE: 18 BRPM | TEMPERATURE: 98.2 F | DIASTOLIC BLOOD PRESSURE: 80 MMHG | BODY MASS INDEX: 24.47 KG/M2 | OXYGEN SATURATION: 96 %

## 2024-01-11 ASSESSMENT — ENCOUNTER SYMPTOMS
SORE THROAT: 0
EYES NEGATIVE: 1
RHINORRHEA: 1
SINUS PRESSURE: 0
ROS SKIN COMMENTS: PRURITIS
SINUS PAIN: 0
RESPIRATORY NEGATIVE: 1
GASTROINTESTINAL NEGATIVE: 1

## 2024-01-18 ENCOUNTER — HOSPITAL ENCOUNTER (EMERGENCY)
Facility: HOSPITAL | Age: 54
Discharge: HOME | End: 2024-01-18
Attending: EMERGENCY MEDICINE
Payer: MEDICAID

## 2024-01-18 VITALS
HEIGHT: 63 IN | HEART RATE: 108 BPM | OXYGEN SATURATION: 99 % | SYSTOLIC BLOOD PRESSURE: 129 MMHG | WEIGHT: 135 LBS | RESPIRATION RATE: 19 BRPM | BODY MASS INDEX: 23.92 KG/M2 | DIASTOLIC BLOOD PRESSURE: 81 MMHG | TEMPERATURE: 98 F

## 2024-01-18 DIAGNOSIS — R11.2 NAUSEA AND VOMITING, UNSPECIFIED VOMITING TYPE: Primary | ICD-10-CM

## 2024-01-18 LAB
ALBUMIN SERPL-MCNC: 3.7 G/DL (ref 3.5–5)
ALBUMIN/GLOB SERPL: 0.9 (ref 1.1–2.2)
ALP SERPL-CCNC: 145 U/L (ref 45–117)
ALT SERPL-CCNC: 44 U/L (ref 12–78)
ANION GAP SERPL CALC-SCNC: 11 MMOL/L (ref 5–15)
APPEARANCE UR: ABNORMAL
AST SERPL W P-5'-P-CCNC: 27 U/L (ref 15–37)
BACTERIA URNS QL MICRO: ABNORMAL /HPF
BASOPHILS # BLD: 0 K/UL (ref 0–0.1)
BASOPHILS NFR BLD: 0 % (ref 0–1)
BILIRUB SERPL-MCNC: 0.5 MG/DL (ref 0.2–1)
BILIRUB UR QL: NEGATIVE
BUN SERPL-MCNC: 26 MG/DL (ref 6–20)
BUN/CREAT SERPL: 22 (ref 12–20)
CA-I BLD-MCNC: 9.1 MG/DL (ref 8.5–10.1)
CHLORIDE SERPL-SCNC: 101 MMOL/L (ref 97–108)
CO2 SERPL-SCNC: 30 MMOL/L (ref 21–32)
COLOR UR: ABNORMAL
CREAT SERPL-MCNC: 1.19 MG/DL (ref 0.55–1.02)
DIFFERENTIAL METHOD BLD: ABNORMAL
EOSINOPHIL # BLD: 0 K/UL (ref 0–0.4)
EOSINOPHIL NFR BLD: 0 % (ref 0–7)
ERYTHROCYTE [DISTWIDTH] IN BLOOD BY AUTOMATED COUNT: 14.9 % (ref 11.5–14.5)
FLUAV RNA SPEC QL NAA+PROBE: NOT DETECTED
FLUBV RNA SPEC QL NAA+PROBE: NOT DETECTED
GLOBULIN SER CALC-MCNC: 4 G/DL (ref 2–4)
GLUCOSE SERPL-MCNC: 104 MG/DL (ref 65–100)
GLUCOSE UR STRIP.AUTO-MCNC: NEGATIVE MG/DL
HCT VFR BLD AUTO: 42 % (ref 35–47)
HGB BLD-MCNC: 14.1 G/DL (ref 11.5–16)
HGB UR QL STRIP: NEGATIVE
IMM GRANULOCYTES # BLD AUTO: 0 K/UL
IMM GRANULOCYTES NFR BLD AUTO: 0 %
KETONES UR QL STRIP.AUTO: NEGATIVE MG/DL
LEUKOCYTE ESTERASE UR QL STRIP.AUTO: NEGATIVE
LIPASE SERPL-CCNC: 15 U/L (ref 13–75)
LYMPHOCYTES # BLD: 1.2 K/UL (ref 0.8–3.5)
LYMPHOCYTES NFR BLD: 8 % (ref 12–49)
MAGNESIUM SERPL-MCNC: 1.5 MG/DL (ref 1.6–2.4)
MCH RBC QN AUTO: 30.4 PG (ref 26–34)
MCHC RBC AUTO-ENTMCNC: 33.6 G/DL (ref 30–36.5)
MCV RBC AUTO: 90.5 FL (ref 80–99)
MONOCYTES # BLD: 1.5 K/UL (ref 0–1)
MONOCYTES NFR BLD: 10 % (ref 5–13)
NEUTS SEG # BLD: 12.1 K/UL (ref 1.8–8)
NEUTS SEG NFR BLD: 82 % (ref 32–75)
NITRITE UR QL STRIP.AUTO: NEGATIVE
NRBC # BLD: 0.02 K/UL (ref 0–0.01)
NRBC BLD-RTO: 0.1 PER 100 WBC
PH UR STRIP: 5.5 (ref 5–8)
PLATELET # BLD AUTO: 178 K/UL (ref 150–400)
PMV BLD AUTO: 11.7 FL (ref 8.9–12.9)
POTASSIUM SERPL-SCNC: 3.5 MMOL/L (ref 3.5–5.1)
PROT SERPL-MCNC: 7.7 G/DL (ref 6.4–8.2)
PROT UR STRIP-MCNC: 30 MG/DL
RBC # BLD AUTO: 4.64 M/UL (ref 3.8–5.2)
RBC #/AREA URNS HPF: ABNORMAL /HPF (ref 0–3)
RBC MORPH BLD: ABNORMAL
SARS-COV-2 RNA RESP QL NAA+PROBE: NOT DETECTED
SODIUM SERPL-SCNC: 142 MMOL/L (ref 136–145)
SP GR UR REFRACTOMETRY: 1.02 (ref 1–1.03)
UROBILINOGEN UR QL STRIP.AUTO: 0.2 EU/DL (ref 0.2–1)
WBC # BLD AUTO: 14.8 K/UL (ref 3.6–11)
WBC URNS QL MICRO: ABNORMAL /HPF (ref 0–5)

## 2024-01-18 PROCEDURE — 83690 ASSAY OF LIPASE: CPT

## 2024-01-18 PROCEDURE — 2580000003 HC RX 258: Performed by: EMERGENCY MEDICINE

## 2024-01-18 PROCEDURE — 6360000002 HC RX W HCPCS: Performed by: EMERGENCY MEDICINE

## 2024-01-18 PROCEDURE — 80053 COMPREHEN METABOLIC PANEL: CPT

## 2024-01-18 PROCEDURE — C9113 INJ PANTOPRAZOLE SODIUM, VIA: HCPCS | Performed by: EMERGENCY MEDICINE

## 2024-01-18 PROCEDURE — A4216 STERILE WATER/SALINE, 10 ML: HCPCS | Performed by: EMERGENCY MEDICINE

## 2024-01-18 PROCEDURE — 83735 ASSAY OF MAGNESIUM: CPT

## 2024-01-18 PROCEDURE — 81001 URINALYSIS AUTO W/SCOPE: CPT

## 2024-01-18 PROCEDURE — 99284 EMERGENCY DEPT VISIT MOD MDM: CPT

## 2024-01-18 PROCEDURE — 96361 HYDRATE IV INFUSION ADD-ON: CPT

## 2024-01-18 PROCEDURE — 85025 COMPLETE CBC W/AUTO DIFF WBC: CPT

## 2024-01-18 PROCEDURE — 96375 TX/PRO/DX INJ NEW DRUG ADDON: CPT

## 2024-01-18 PROCEDURE — 87636 SARSCOV2 & INF A&B AMP PRB: CPT

## 2024-01-18 PROCEDURE — 96374 THER/PROPH/DIAG INJ IV PUSH: CPT

## 2024-01-18 PROCEDURE — 36415 COLL VENOUS BLD VENIPUNCTURE: CPT

## 2024-01-18 RX ORDER — ONDANSETRON 2 MG/ML
4 INJECTION INTRAMUSCULAR; INTRAVENOUS EVERY 6 HOURS PRN
Status: DISCONTINUED | OUTPATIENT
Start: 2024-01-18 | End: 2024-01-18 | Stop reason: HOSPADM

## 2024-01-18 RX ORDER — 0.9 % SODIUM CHLORIDE 0.9 %
1000 INTRAVENOUS SOLUTION INTRAVENOUS ONCE
Status: COMPLETED | OUTPATIENT
Start: 2024-01-18 | End: 2024-01-18

## 2024-01-18 RX ORDER — ONDANSETRON 2 MG/ML
4 INJECTION INTRAMUSCULAR; INTRAVENOUS ONCE
Status: COMPLETED | OUTPATIENT
Start: 2024-01-18 | End: 2024-01-18

## 2024-01-18 RX ORDER — 0.9 % SODIUM CHLORIDE 0.9 %
1000 INTRAVENOUS SOLUTION INTRAVENOUS ONCE
Status: DISCONTINUED | OUTPATIENT
Start: 2024-01-18 | End: 2024-01-18 | Stop reason: HOSPADM

## 2024-01-18 RX ORDER — ONDANSETRON 4 MG/1
4 TABLET, FILM COATED ORAL 3 TIMES DAILY PRN
Qty: 15 TABLET | Refills: 0 | Status: SHIPPED | OUTPATIENT
Start: 2024-01-18

## 2024-01-18 RX ADMIN — PANTOPRAZOLE SODIUM 40 MG: 40 INJECTION, POWDER, FOR SOLUTION INTRAVENOUS at 12:33

## 2024-01-18 RX ADMIN — SODIUM CHLORIDE 1000 ML: 9 INJECTION, SOLUTION INTRAVENOUS at 12:29

## 2024-01-18 RX ADMIN — ONDANSETRON 4 MG: 2 INJECTION INTRAMUSCULAR; INTRAVENOUS at 12:34

## 2024-01-18 NOTE — ED TRIAGE NOTES
Pt with hx of breast ca reports nausea, vomiting and diarrhea since this morning. Pt reports she had chemo on Friday. Pt voices concerns that her house may have mold. Pt would like a note saying she needs to be examined for mold.

## 2024-01-18 NOTE — ED PROVIDER NOTES
[facility] EMERGENCY DEPT  EMERGENCY DEPARTMENT HISTORY AND PHYSICAL EXAM      Date: 1/18/2024  Patient Name: Sarah Foster  MRN: [id number]  YOB: 1970  Date of evaluation: 1/18/2024  Provider: Melissa Mendoza MD   Note Started: 11:55 AM EST 1/18/24    HISTORY OF PRESENT ILLNESS     Chief Complaint   Patient presents with    Nausea       History Provided By: Patient    HPI: Sarah Foster is a 53 y.o. female     PAST MEDICAL HISTORY   Past Medical History:  History reviewed. No pertinent past medical history.    Past Surgical History:  History reviewed. No pertinent surgical history.    Family History:  History reviewed. No pertinent family history.    Social History:  Social History     Tobacco Use    Smoking status: Former     Types: Cigarettes    Smokeless tobacco: Never   Vaping Use    Vaping Use: Never used   Substance Use Topics    Alcohol use: Not Currently    Drug use: Not Currently     Types: Marijuana (Weed)       Allergies:  No Known Allergies    PCP: Christin Dillard APRN - CNP    Current Meds:   Current Facility-Administered Medications   Medication Dose Route Frequency Provider Last Rate Last Admin    ondansetron (ZOFRAN) injection 4 mg  4 mg IntraVENous Q6H PRN Melissa Mendoza MD        sodium chloride 0.9 % bolus 1,000 mL  1,000 mL IntraVENous Once Melissa Mendoza MD         Current Outpatient Medications   Medication Sig Dispense Refill    hydrOXYzine HCl (ATARAX) 25 MG tablet Take 1 tablet by mouth every 6 hours as needed for Itching 90 tablet 0    fluticasone (FLONASE) 50 MCG/ACT nasal spray 2 sprays by Nasal route daily 16 g 2       Social Determinants of Health:   Social Determinants of Health     Tobacco Use: Medium Risk (1/18/2024)    Patient History     Smoking Tobacco Use: Former     Smokeless Tobacco Use: Never     Passive Exposure: Not on file   Alcohol Use: Not on file   Financial Resource Strain: Not on file   Food Insecurity: Not on file  nausea vomiting x 1 and and diarrhea x 2 today,, patient states she ate spaghetti dinner last night and 4 hours later she vomited up all the spaghetti she had eaten, patient is receiving chemotherapy treatment for breast cancer, her last chemo #3 session was 6 days ago    Records Reviewed (source and summary of external notes): Prior medical records and Nursing notes    Vitals:    Vitals:    01/18/24 1144   BP: 114/77   Pulse: (!) 108   Resp: 19   Temp: 98 °F (36.7 °C)   SpO2: 98%   Weight: 61.2 kg (135 lb)   Height: 1.6 m (5' 3\")        ED COURSE  IV fluids  Labs  Zofran IV  Protonix IV    ED Course as of 01/18/24 1324   Thu Jan 18, 2024   1210 WBC(!): 14.8 [SB]   1319 Neutrophils %(!): 82 [SB]      ED Course User Index  [SB] Melissa Mendoza MD       Disposition Considerations (Tests not done, Shared Decision Making, Pt Expectation of Test or Treatment.):  Patient hydrated symptoms improved    Patient was given the following medications:  Medications   ondansetron (ZOFRAN) injection 4 mg (has no administration in time range)   sodium chloride 0.9 % bolus 1,000 mL (has no administration in time range)       CONSULTS: (Who and What was discussed)  None     Social Determinants affecting Dx or Tx: None    Smoking Cessation: Not Applicable    PROCEDURES   Unless otherwise noted above, none.  Procedures      CRITICAL CARE TIME   Patient does not meet Critical Care Time, 0 minutes    FINAL IMPRESSION   No diagnosis found.      DISPOSITION/PLAN   DISPOSITION      Discharge Note: The patient is stable for discharge home. The signs, symptoms, diagnosis, and discharge instructions have been discussed, understanding conveyed, and agreed upon. The patient is to follow up as recommended or return to ER should their symptoms worsen.      PATIENT REFERRED TO:  No follow-up provider specified.      DISCHARGE MEDICATIONS:     Medication List        ASK your doctor about these medications      fluticasone 50 MCG/ACT nasal

## 2024-01-29 DIAGNOSIS — J30.2 SEASONAL ALLERGIES: ICD-10-CM

## 2024-01-29 RX ORDER — HYDROXYZINE HYDROCHLORIDE 25 MG/1
25 TABLET, FILM COATED ORAL EVERY 6 HOURS PRN
Qty: 90 TABLET | Refills: 0 | Status: SHIPPED | OUTPATIENT
Start: 2024-01-29

## 2024-02-05 ENCOUNTER — HOSPITAL ENCOUNTER (EMERGENCY)
Facility: HOSPITAL | Age: 54
Discharge: HOME | End: 2024-02-05
Attending: EMERGENCY MEDICINE
Payer: MEDICAID

## 2024-02-05 VITALS
OXYGEN SATURATION: 99 % | TEMPERATURE: 97.9 F | SYSTOLIC BLOOD PRESSURE: 106 MMHG | BODY MASS INDEX: 24.8 KG/M2 | DIASTOLIC BLOOD PRESSURE: 70 MMHG | WEIGHT: 140 LBS | HEART RATE: 95 BPM | RESPIRATION RATE: 18 BRPM | HEIGHT: 63 IN

## 2024-02-05 DIAGNOSIS — R11.2 NAUSEA VOMITING AND DIARRHEA: Primary | ICD-10-CM

## 2024-02-05 DIAGNOSIS — R19.7 NAUSEA VOMITING AND DIARRHEA: Primary | ICD-10-CM

## 2024-02-05 DIAGNOSIS — E86.0 DEHYDRATION: ICD-10-CM

## 2024-02-05 LAB
ALBUMIN SERPL-MCNC: 3.8 G/DL (ref 3.5–5)
ALBUMIN/GLOB SERPL: 0.9 (ref 1.1–2.2)
ALP SERPL-CCNC: 169 U/L (ref 45–117)
ALT SERPL-CCNC: 178 U/L (ref 12–78)
ANION GAP SERPL CALC-SCNC: 12 MMOL/L (ref 5–15)
AST SERPL W P-5'-P-CCNC: 47 U/L (ref 15–37)
BASOPHILS # BLD: 0.1 K/UL (ref 0–0.1)
BASOPHILS NFR BLD: 1 % (ref 0–1)
BILIRUB SERPL-MCNC: 0.8 MG/DL (ref 0.2–1)
BUN SERPL-MCNC: 32 MG/DL (ref 6–20)
BUN/CREAT SERPL: 24 (ref 12–20)
CA-I BLD-MCNC: 9.2 MG/DL (ref 8.5–10.1)
CHLORIDE SERPL-SCNC: 96 MMOL/L (ref 97–108)
CO2 SERPL-SCNC: 27 MMOL/L (ref 21–32)
CREAT SERPL-MCNC: 1.32 MG/DL (ref 0.55–1.02)
DIFFERENTIAL METHOD BLD: ABNORMAL
EOSINOPHIL # BLD: 0.1 K/UL (ref 0–0.4)
EOSINOPHIL NFR BLD: 2 % (ref 0–7)
ERYTHROCYTE [DISTWIDTH] IN BLOOD BY AUTOMATED COUNT: 15.9 % (ref 11.5–14.5)
GLOBULIN SER CALC-MCNC: 4.1 G/DL (ref 2–4)
GLUCOSE SERPL-MCNC: 117 MG/DL (ref 65–100)
HCT VFR BLD AUTO: 40 % (ref 35–47)
HGB BLD-MCNC: 13.9 G/DL (ref 11.5–16)
IMM GRANULOCYTES # BLD AUTO: 0 K/UL
IMM GRANULOCYTES NFR BLD AUTO: 0 %
LIPASE SERPL-CCNC: 27 U/L (ref 13–75)
LYMPHOCYTES # BLD: 0.8 K/UL (ref 0.8–3.5)
LYMPHOCYTES NFR BLD: 13 % (ref 12–49)
MAGNESIUM SERPL-MCNC: 1.7 MG/DL (ref 1.6–2.4)
MCH RBC QN AUTO: 31.5 PG (ref 26–34)
MCHC RBC AUTO-ENTMCNC: 34.8 G/DL (ref 30–36.5)
MCV RBC AUTO: 90.7 FL (ref 80–99)
MONOCYTES # BLD: 0.6 K/UL (ref 0–1)
MONOCYTES NFR BLD: 10 % (ref 5–13)
NEUTS SEG # BLD: 4.6 K/UL (ref 1.8–8)
NEUTS SEG NFR BLD: 74 % (ref 32–75)
NRBC # BLD: 0 K/UL (ref 0–0.01)
NRBC BLD-RTO: 0 PER 100 WBC
PLATELET # BLD AUTO: 147 K/UL (ref 150–400)
PMV BLD AUTO: 11.8 FL (ref 8.9–12.9)
POTASSIUM SERPL-SCNC: 3.7 MMOL/L (ref 3.5–5.1)
PROT SERPL-MCNC: 7.9 G/DL (ref 6.4–8.2)
RBC # BLD AUTO: 4.41 M/UL (ref 3.8–5.2)
RBC MORPH BLD: ABNORMAL
SODIUM SERPL-SCNC: 135 MMOL/L (ref 136–145)
WBC # BLD AUTO: 6.2 K/UL (ref 3.6–11)

## 2024-02-05 PROCEDURE — 80053 COMPREHEN METABOLIC PANEL: CPT

## 2024-02-05 PROCEDURE — 2580000003 HC RX 258: Performed by: EMERGENCY MEDICINE

## 2024-02-05 PROCEDURE — 85025 COMPLETE CBC W/AUTO DIFF WBC: CPT

## 2024-02-05 PROCEDURE — 96361 HYDRATE IV INFUSION ADD-ON: CPT

## 2024-02-05 PROCEDURE — 83690 ASSAY OF LIPASE: CPT

## 2024-02-05 PROCEDURE — 6360000002 HC RX W HCPCS: Performed by: EMERGENCY MEDICINE

## 2024-02-05 PROCEDURE — 83735 ASSAY OF MAGNESIUM: CPT

## 2024-02-05 PROCEDURE — 96375 TX/PRO/DX INJ NEW DRUG ADDON: CPT

## 2024-02-05 PROCEDURE — 96374 THER/PROPH/DIAG INJ IV PUSH: CPT

## 2024-02-05 PROCEDURE — 99284 EMERGENCY DEPT VISIT MOD MDM: CPT

## 2024-02-05 PROCEDURE — C9113 INJ PANTOPRAZOLE SODIUM, VIA: HCPCS | Performed by: EMERGENCY MEDICINE

## 2024-02-05 PROCEDURE — 36415 COLL VENOUS BLD VENIPUNCTURE: CPT

## 2024-02-05 PROCEDURE — A4216 STERILE WATER/SALINE, 10 ML: HCPCS | Performed by: EMERGENCY MEDICINE

## 2024-02-05 RX ORDER — 0.9 % SODIUM CHLORIDE 0.9 %
1000 INTRAVENOUS SOLUTION INTRAVENOUS ONCE
Status: COMPLETED | OUTPATIENT
Start: 2024-02-05 | End: 2024-02-05

## 2024-02-05 RX ORDER — ONDANSETRON 2 MG/ML
4 INJECTION INTRAMUSCULAR; INTRAVENOUS ONCE
Status: COMPLETED | OUTPATIENT
Start: 2024-02-05 | End: 2024-02-05

## 2024-02-05 RX ADMIN — SODIUM CHLORIDE 1000 ML: 9 INJECTION, SOLUTION INTRAVENOUS at 10:43

## 2024-02-05 RX ADMIN — SODIUM CHLORIDE 1000 ML: 9 INJECTION, SOLUTION INTRAVENOUS at 11:39

## 2024-02-05 RX ADMIN — PANTOPRAZOLE SODIUM 40 MG: 40 INJECTION, POWDER, FOR SOLUTION INTRAVENOUS at 10:43

## 2024-02-05 RX ADMIN — ONDANSETRON 4 MG: 2 INJECTION INTRAMUSCULAR; INTRAVENOUS at 10:43

## 2024-02-05 NOTE — ED PROVIDER NOTES
[facility] EMERGENCY DEPT  EMERGENCY DEPARTMENT HISTORY AND PHYSICAL EXAM      Date: 2/5/2024  Patient Name: Sarah Foster  MRN: [id number]  YOB: 1970  Date of evaluation: 2/5/2024  Provider: Melissa Mendoza MD   Note Started: 10:34 AM EST 2/5/24    HISTORY OF PRESENT ILLNESS     Chief Complaint   Patient presents with    Emesis       History Provided By: Patient    HPI: Sarah Foster is a 53 y.o. female     PAST MEDICAL HISTORY   Past Medical History:  Past Medical History:   Diagnosis Date    Breast cancer (HCC)        Past Surgical History:  History reviewed. No pertinent surgical history.    Family History:  History reviewed. No pertinent family history.    Social History:  Social History     Tobacco Use    Smoking status: Former     Types: Cigarettes    Smokeless tobacco: Never   Vaping Use    Vaping Use: Never used   Substance Use Topics    Alcohol use: Not Currently    Drug use: Not Currently     Types: Marijuana (Weed)       Allergies:  No Known Allergies    PCP: Christin Dillard APRN - CNP    Current Meds:   Current Facility-Administered Medications   Medication Dose Route Frequency Provider Last Rate Last Admin    ondansetron (ZOFRAN) injection 4 mg  4 mg IntraVENous Once Melissa Mendoza MD        pantoprazole (PROTONIX) 40 mg in sodium chloride (PF) 0.9 % 10 mL injection  40 mg IntraVENous Once Melissa Mendoza MD        sodium chloride 0.9 % bolus 1,000 mL  1,000 mL IntraVENous Once Melissa Mendoza MD         Current Outpatient Medications   Medication Sig Dispense Refill    hydrOXYzine HCl (ATARAX) 25 MG tablet TAKE 1 TABLET BY MOUTH EVERY 6 HOURS AS NEEDED FOR ITCHING. 90 tablet 0    ondansetron (ZOFRAN) 4 MG tablet Take 1 tablet by mouth 3 times daily as needed for Nausea or Vomiting 15 tablet 0    fluticasone (FLONASE) 50 MCG/ACT nasal spray 2 sprays by Nasal route daily 16 g 2       Social Determinants of Health:   Social Determinants of Health

## 2024-02-05 NOTE — DISCHARGE INSTRUCTIONS
No more eating of fatty oily foods since this causes you to have vomiting and diarrhea but in combination with chemotherapy and you are supposed to increase your water intake after each session if you do not hydrate yourself well and then have vomiting and diarrhea you are  at risk of having kidney failure  
Yes

## 2024-03-01 NOTE — ED TRIAGE NOTES
Problem: Skin Integrity Alteration  Goal: Skin remains intact with no new/deterioration of wound or pressure injury  Outcome: Monitoring/Evaluating progress  Goal: Participates in wound care activities  Outcome: Monitoring/Evaluating progress      Patient reports chemo on Friday for stage 2 breast cancer and ate a  cheeseburger report she started having vomiting and nausea yesterday  and has had one episode of  vomiting this morning

## 2024-07-17 ENCOUNTER — OFFICE VISIT (OUTPATIENT)
Facility: CLINIC | Age: 54
End: 2024-07-17
Payer: MEDICAID

## 2024-07-17 VITALS
RESPIRATION RATE: 18 BRPM | HEART RATE: 70 BPM | TEMPERATURE: 98.2 F | WEIGHT: 129 LBS | HEIGHT: 63 IN | BODY MASS INDEX: 22.86 KG/M2 | SYSTOLIC BLOOD PRESSURE: 125 MMHG | DIASTOLIC BLOOD PRESSURE: 84 MMHG | OXYGEN SATURATION: 98 %

## 2024-07-17 DIAGNOSIS — L29.9 CHRONIC PRURITUS: ICD-10-CM

## 2024-07-17 DIAGNOSIS — Z17.0 MALIGNANT NEOPLASM OF OVERLAPPING SITES OF RIGHT BREAST IN FEMALE, ESTROGEN RECEPTOR POSITIVE (HCC): Primary | ICD-10-CM

## 2024-07-17 DIAGNOSIS — Z87.891 FORMER SMOKER: ICD-10-CM

## 2024-07-17 DIAGNOSIS — C50.811 MALIGNANT NEOPLASM OF OVERLAPPING SITES OF RIGHT BREAST IN FEMALE, ESTROGEN RECEPTOR POSITIVE (HCC): Primary | ICD-10-CM

## 2024-07-17 DIAGNOSIS — J30.2 SEASONAL ALLERGIES: ICD-10-CM

## 2024-07-17 PROCEDURE — 99214 OFFICE O/P EST MOD 30 MIN: CPT

## 2024-07-17 RX ORDER — HYDROXYZINE HYDROCHLORIDE 25 MG/1
25 TABLET, FILM COATED ORAL EVERY 6 HOURS PRN
Qty: 90 TABLET | Refills: 2 | Status: SHIPPED | OUTPATIENT
Start: 2024-07-17

## 2024-07-17 RX ORDER — FLUTICASONE PROPIONATE 50 MCG
2 SPRAY, SUSPENSION (ML) NASAL DAILY
Qty: 16 G | Refills: 2 | Status: SHIPPED | OUTPATIENT
Start: 2024-07-17

## 2024-07-17 SDOH — ECONOMIC STABILITY: TRANSPORTATION INSECURITY
IN THE PAST 12 MONTHS, HAS THE LACK OF TRANSPORTATION KEPT YOU FROM MEDICAL APPOINTMENTS OR FROM GETTING MEDICATIONS?: YES

## 2024-07-17 SDOH — ECONOMIC STABILITY: FOOD INSECURITY: WITHIN THE PAST 12 MONTHS, THE FOOD YOU BOUGHT JUST DIDN'T LAST AND YOU DIDN'T HAVE MONEY TO GET MORE.: SOMETIMES TRUE

## 2024-07-17 SDOH — ECONOMIC STABILITY: FOOD INSECURITY: WITHIN THE PAST 12 MONTHS, YOU WORRIED THAT YOUR FOOD WOULD RUN OUT BEFORE YOU GOT MONEY TO BUY MORE.: OFTEN TRUE

## 2024-07-17 SDOH — ECONOMIC STABILITY: HOUSING INSECURITY
IN THE LAST 12 MONTHS, WAS THERE A TIME WHEN YOU DID NOT HAVE A STEADY PLACE TO SLEEP OR SLEPT IN A SHELTER (INCLUDING NOW)?: NO

## 2024-07-17 SDOH — ECONOMIC STABILITY: INCOME INSECURITY: IN THE LAST 12 MONTHS, WAS THERE A TIME WHEN YOU WERE NOT ABLE TO PAY THE MORTGAGE OR RENT ON TIME?: NO

## 2024-07-17 SDOH — ECONOMIC STABILITY: INCOME INSECURITY: HOW HARD IS IT FOR YOU TO PAY FOR THE VERY BASICS LIKE FOOD, HOUSING, MEDICAL CARE, AND HEATING?: HARD

## 2024-07-17 SDOH — ECONOMIC STABILITY: TRANSPORTATION INSECURITY
IN THE PAST 12 MONTHS, HAS LACK OF TRANSPORTATION KEPT YOU FROM MEETINGS, WORK, OR FROM GETTING THINGS NEEDED FOR DAILY LIVING?: YES

## 2024-07-17 SDOH — ECONOMIC STABILITY: HOUSING INSECURITY: IN THE LAST 12 MONTHS, HOW MANY PLACES HAVE YOU LIVED?: 1

## 2024-07-17 ASSESSMENT — ENCOUNTER SYMPTOMS
RESPIRATORY NEGATIVE: 1
EYES NEGATIVE: 1
GASTROINTESTINAL NEGATIVE: 1

## 2024-07-17 ASSESSMENT — PATIENT HEALTH QUESTIONNAIRE - PHQ9
2. FEELING DOWN, DEPRESSED OR HOPELESS: SEVERAL DAYS
SUM OF ALL RESPONSES TO PHQ9 QUESTIONS 1 & 2: 1
SUM OF ALL RESPONSES TO PHQ QUESTIONS 1-9: 1
1. LITTLE INTEREST OR PLEASURE IN DOING THINGS: NOT AT ALL
SUM OF ALL RESPONSES TO PHQ QUESTIONS 1-9: 1

## 2024-07-17 NOTE — PROGRESS NOTES
Sarah Foster is a 54 y.o. female who presents to the office today for the following:    Chief Complaint   Patient presents with    Follow-up    Allergies       Past Medical History:   Diagnosis Date    Breast cancer (HCC)         History reviewed. No pertinent surgical history.     History reviewed. No pertinent family history.     Social History     Tobacco Use    Smoking status: Former     Types: Cigarettes    Smokeless tobacco: Never   Vaping Use    Vaping Use: Never used   Substance Use Topics    Alcohol use: Not Currently    Drug use: Not Currently     Types: Marijuana (Weed)        HPI  Patient here for follow up of chronic conditions with PMH seasonal allergies, chemo induced cardiomyopathy, CAD, former smoker and right breast cancer stage 3B, HER2+, following with [facility] currently undergoing chemo and radiation. Left chest port-a-cath. Poor historian. Last PAP abnormal in 2018 without follow up. Denies colonoscopy in past.     Today patient states that she still have 1 chemo treatment to go, that she has not been compliant with follow up with oncology. That she has missed many appts but does plan to reschedule. She has been very busy taking care of elderly aunt. She is aware of risks of not following thru with chemo as directed.    She is not open to scheduling LDLCT or colonoscopy at this time. She is open to PAP now. She forgot prior PAP appt. She also forgot prev ordered labs. Will get them done today.    Chronic pruritus much improved with Vistaril and allergies are controlled with Flonase. Requesting refills. No complaints today.    Chief Complaint   Patient presents with    Follow-up    Allergies       Current Outpatient Medications on File Prior to Visit   Medication Sig Dispense Refill    ondansetron (ZOFRAN) 4 MG tablet Take 1 tablet by mouth 3 times daily as needed for Nausea or Vomiting 15 tablet 0     No current facility-administered medications on file prior to visit.

## 2024-07-17 NOTE — PATIENT INSTRUCTIONS
[email or web address]  Assistance application can be downloaded from above website  Financial Counseling Call Center: [phone]          Medications    Good Rx  What they offer: Good Rx tracks prescription drug prices and provides free drug coupons for discounts on medications.  Website: https://www.Picsean/  NeedyMeds  What they offer: NeedyMeds offers free information on medications and healthcare cost savings programs including prescription assistance programs, coupons, and discount programs.  Website: https://www.ReTargeter.org/  Helpline: 938.246.4505    RX Assist  What they offer: Information about free and low-cost medicine programs.  Website: https://www.rxassist.OneBuild/    [employer] $4 Prescription Program  What they offer: Prescription Program includes up to a 30-day supply for $4 and a 90-day supply for $10 of some covered generic drugs at commonly prescribed dosages  Website: https://www.Monkey Bizness/cp/4-prescriptions/0882810Hrwonizna  CommonHelp  What they offer: Partnership with the [place] Department of . Assist with finding and applying for government funded programs and benefits. You can also update your benefits or report changes through [employer].  Website: https://www.Cormedics.[place].[employer]/  Phone Number: 833-5CALLVA (584-816-8586)    [employer] [place] [employer] EnergyShare  What they offer: EnergyShare is [facility]’s energy assistance program of last resort for anyone who faces financial hardships from unemployment or family crisis.  Phone Number: 837.131.8063  Address: [address] [place] [address]  Website: https://www.Zairge/[place]/billing/billing-options/energyshare    Energy Assistance Programs (EAP) - Department of   What they offer: EAP assists low-income households in meeting their immediate home energy needs.      Website:

## 2024-07-17 NOTE — PROGRESS NOTES
Chief Complaint   Patient presents with    Follow-up     \"Have you been to the ER, urgent care clinic since your last visit?  Hospitalized since your last visit?\"    NO    “Have you seen or consulted any other health care providers outside of [facility] since your last visit?”    NO    Have you had a mammogram?”   NO    Date of last Mammogram: 3/29/2023      “Have you had a pap smear?”    NO    Date of last Cervical Cancer screen (HPV or PAP): 7/10/2018         “Have you had a colorectal cancer screening such as a colonoscopy/FIT/Cologuard?    NO    No colonoscopy on file  No cologuard on file  No FIT/FOBT on file   No flexible sigmoidoscopy on file         Click Here for Release of Records Request

## 2024-08-29 ENCOUNTER — OFFICE VISIT (OUTPATIENT)
Facility: CLINIC | Age: 54
End: 2024-08-29
Payer: MEDICAID

## 2024-08-29 VITALS
BODY MASS INDEX: 23.96 KG/M2 | TEMPERATURE: 98.8 F | SYSTOLIC BLOOD PRESSURE: 125 MMHG | DIASTOLIC BLOOD PRESSURE: 63 MMHG | WEIGHT: 135.25 LBS | HEART RATE: 56 BPM | HEIGHT: 63 IN | OXYGEN SATURATION: 97 % | RESPIRATION RATE: 20 BRPM

## 2024-08-29 DIAGNOSIS — Z01.411 ENCOUNTER FOR GYNECOLOGICAL EXAMINATION WITH ABNORMAL FINDING: ICD-10-CM

## 2024-08-29 DIAGNOSIS — Z12.4 PAP SMEAR FOR CERVICAL CANCER SCREENING: Primary | ICD-10-CM

## 2024-08-29 PROCEDURE — 99396 PREV VISIT EST AGE 40-64: CPT

## 2024-08-29 ASSESSMENT — ENCOUNTER SYMPTOMS
GASTROINTESTINAL NEGATIVE: 1
RESPIRATORY NEGATIVE: 1
EYES NEGATIVE: 1

## 2024-08-29 ASSESSMENT — PATIENT HEALTH QUESTIONNAIRE - PHQ9
2. FEELING DOWN, DEPRESSED OR HOPELESS: NOT AT ALL
1. LITTLE INTEREST OR PLEASURE IN DOING THINGS: NOT AT ALL
SUM OF ALL RESPONSES TO PHQ QUESTIONS 1-9: 0
SUM OF ALL RESPONSES TO PHQ QUESTIONS 1-9: 0
SUM OF ALL RESPONSES TO PHQ9 QUESTIONS 1 & 2: 0
SUM OF ALL RESPONSES TO PHQ QUESTIONS 1-9: 0
SUM OF ALL RESPONSES TO PHQ QUESTIONS 1-9: 0

## 2024-08-29 NOTE — PROGRESS NOTES
Chief Complaint   Patient presents with    Annual Exam     For pap smear.     \"Have you been to the ER, urgent care clinic since your last visit?  Hospitalized since your last visit?\"    NO    “Have you seen or consulted any other health care providers outside of [facility] since your last visit?”    NO    Have you had a mammogram?”   NO    Date of last Mammogram: 3/29/2023      “Have you had a pap smear?”    NO  having 1 today.    Date of last Cervical Cancer screen (HPV or PAP): 7/10/2018         “Have you had a colorectal cancer screening such as a colonoscopy/FIT/Cologuard?    NO    No colonoscopy on file  No cologuard on file  No FIT/FOBT on file   No flexible sigmoidoscopy on file  /63 (Site: Left Upper Arm, Position: Sitting, Cuff Size: Small Adult)   Pulse 56   Temp 98.8 °F (37.1 °C) (Oral)   Resp 20   Ht 1.6 m (5' 3\")   Wt 61.3 kg (135 lb 4 oz)   SpO2 97%   BMI 23.96 kg/m²           Click Here for Release of Records Request

## 2024-08-29 NOTE — PROGRESS NOTES
Neurological: Negative.    Psychiatric/Behavioral: Negative.           /63 (Site: Left Upper Arm, Position: Sitting, Cuff Size: Small Adult)   Pulse 56   Temp 98.8 °F (37.1 °C) (Oral)   Resp 20   Ht 1.6 m (5' 3\")   Wt 61.3 kg (135 lb 4 oz)   SpO2 97%   BMI 23.96 kg/m²     Last Point of Care HGB A1C  No results found for: \"GLD3ZXKC\"      Physical Exam  Constitutional:       Appearance: Normal appearance.   Pulmonary:      Effort: Pulmonary effort is normal.   Genitourinary:     General: Normal vulva.      Rectum: Normal.   Neurological:      General: No focal deficit present.      Mental Status: She is alert and oriented to person, place, and time.   Psychiatric:         Mood and Affect: Mood normal.         Behavior: Behavior normal.         Thought Content: Thought content normal.         Judgment: Judgment normal.          1. Pap smear for cervical cancer screening  -     PAP IG, CT-NG-TV, Aptima HPV and rfx 16/18,45 (199315)  PAP obtained with minimal discomfort reported by patient.  Will repeat in 3 years pending WNL.     2. Encounter for gynecological examination with abnormal finding  -     [facility] - Jesse Manzo MD, Ob-Gyn, [place]        Vaginal exam performed via speculum with minimal discomfort reported by patient.  No adnexal tenderness during bimanual exam.   Possible cervical polyp, however exam was difficult. Would like her seen by GYN for exam. Patient has contact for self scheduling.         We discussed the expected course, resolution and complications of the diagnosis(es) in detail.  Medication risks, benefits, costs, interactions, and alternatives were discussed as indicated.  I advised her to contact the office if her condition worsens, changes or fails to improve as anticipated. She expressed understanding with the diagnosis(es) and plan.     Patient verbalizes understanding of plan of care as discussed above    Return in about 5 months (around 1/29/2025), or if symptoms  worsen or fail to improve, for well adult exam.

## 2024-09-06 LAB

## 2024-11-13 ENCOUNTER — HOSPITAL ENCOUNTER (EMERGENCY)
Facility: HOSPITAL | Age: 54
Discharge: HOME | End: 2024-11-13
Attending: EMERGENCY MEDICINE
Payer: MEDICAID

## 2024-11-13 VITALS
DIASTOLIC BLOOD PRESSURE: 72 MMHG | TEMPERATURE: 98 F | HEART RATE: 61 BPM | OXYGEN SATURATION: 99 % | RESPIRATION RATE: 16 BRPM | SYSTOLIC BLOOD PRESSURE: 126 MMHG

## 2024-11-13 DIAGNOSIS — F43.21 SITUATIONAL DEPRESSION: Primary | ICD-10-CM

## 2024-11-13 PROCEDURE — 99283 EMERGENCY DEPT VISIT LOW MDM: CPT

## 2024-11-13 RX ORDER — ONDANSETRON 4 MG/1
4 TABLET, FILM COATED ORAL 3 TIMES DAILY PRN
Qty: 15 TABLET | Refills: 0 | Status: SHIPPED | OUTPATIENT
Start: 2024-11-13

## 2024-11-13 ASSESSMENT — PAIN - FUNCTIONAL ASSESSMENT: PAIN_FUNCTIONAL_ASSESSMENT: 0-10

## 2024-11-13 ASSESSMENT — PAIN SCALES - GENERAL: PAINLEVEL_OUTOF10: 0

## 2024-11-13 NOTE — ED NOTES
[facility] a bedside.   Physician Documentation                                                                           

[facility]                                                                         

Name: Mimi Raphael                                                                                 

Age: 67 yrs                                                                                       

Sex: Female                                                                                       

: 1949                                                                                   

MRN: [id number]                                                                                     

Arrival Date: 2016                                                                          

Time: 13:00                                                                                       

Account#: [id number]                                                                              

Bed 4                                                                                             

Private MD: [facility]                                                   

Disposition:                                                                                      

16 15:10 Hospitalization ordered by Corinne Moore for Inpatient Admission. Preliminary           

diagnosis are Chronic obstructive pulmonary disease with (acute)                                

exacerbation, Pleural effusion, not elsewhere classified, Hypotension.                          

- Bed requested for  PCU.                                                                        

- Status is Inpatient Admission.                                                              jmb 

- Condition is Stable.                                                                            

- Problem is new.                                                                                 

- Symptoms are unchanged.                                                                         

                                                                                                  

                                                                                                  

                                                                                                  

Historical:                                                                                       

- Allergies: SULFA (SULFONAMIDES) (Rash);                                                         

- Home Meds:                                                                                      

1. Pradaxa 150 mg oral cap 1 cap 2 times per day                                                

2. metoprolol tartrate 25 mg Oral tab 12.5 mg 2 times per day                                   

3. atorvastatin 40 mg oral tab 1 tab once daily                                                 

4. ProAir HFA 108mcg inhalation HFAA 2 puffs every 6 hours every 2 hours as needed for          

     SOB                                                                                          

5. Spiriva with HandiHaler 18 mcg Inhl CpDv 1 cap once daily                                    

6. Symbicort 160-4.5 mcg/actuation inhalation HFAA 2 puffs 2 times per day                      

7. Breo Ellipta 200-25 mcg/dose inhalation dsdv 1 puff once daily                               

8. potassium chloride 10 mEq Oral TbER 1 tab 2 times per day                                    

9. lisinopril 2.5 mg oral tab once daily                                                        

10. furosemide 80 mg oral tab 1 tab once daily                                                  

11. metformin 500 mg Oral tab 1 tab 2 times per day                                             

12. hydrochlorothiazide 12.5 mg Oral tab 1 tab once daily                                       

13. multivitamin Oral tab 1 tab daily                                                           

14. loratadine 10 mg Oral cap 10 mg daily as needed                                             

15. aspirin 81 mg Oral TbEC 1 tab once daily                                                    

16. docusate calcium 240 mg Oral cap 1 cap 2 times per day                                      

- PMHx: CAD; CHF; COPD; Diabetes - NIDDM: controlled; Hypercholesterolemia;                       

Hypertension; Myocardial infarction; Obesity; Sleep Apnea w/o CPAP;                             

- PSHx: Colon Resection; Coronary Stents; Hernia repair; hernia revision; tendon                  

repaired;                                                                                       

- Social history: Smoking status: Patient states former smoker of tobacco. No barriers            

to communication noted, The patient speaks fluent English.                                      

- Family history: Not pertinent.                                                                  

- : The pt / caregiver states he / she is on anticoagulants: Pradaxa (Dabigatran) Home            

medication list is obtained from the patient, patients' pharmacy. [employer] import data.          

- Exposure Risk Screening:: None identified.                                                      

                                                                                                  

                                                                                                  

Vital Signs:                                                                                      

                                                                                             

13:24 BP 92 / 55; Pulse 85; Resp 24; Temp 98.1(O); Pulse Ox 90% on 2 lpm NC; Weight 148.32 kg dem1

      / 326.99 lbs; Height 5 ft. 7 in. (170.18 cm); Pain 8/10;                                    

14:19  / 53 (auto/);                                                                    jmb 

14:19 Pulse 84 MON; Pulse Ox 97% ;                                                            jmb 

14:20 BP 80 / 39 RA Supine (man/lg);                                                          jrd 

15:08  / 56 (auto/);                                                                    jmb 

15:08 Pulse 94 MON; Pulse Ox 93% ;                                                            jmb 

15:38  / 68 (auto/);                                                                    jmb 

15:38 Pulse 96 MON; Pulse Ox 87% ;                                                            jmb 

15:50  / 69 (auto/);                                                                    jmb 

15:50 Pulse 92 MON; Pulse Ox 91% ;                                                            jmb 

15:53  / 56 (auto/);                                                                    jmb 

15:54 Pulse 92 MON; Pulse Ox 90% ;                                                            jmb 

16:08  / 83 (auto/);                                                                    jmb 

16:09 Pulse 94 MON; Pulse Ox 90% ;                                                            jmb 

16:38  / 57 (auto/);                                                                    jmb 

16:39 Pulse 90 MON; Pulse Ox 93% ;                                                            jmb 

16:53  / 61 (auto/);                                                                    jmb 

16:54 Pulse 92 MON; Pulse Ox 96% ;                                                            jmb 

17:08  / 59 (auto/);                                                                    jmb 

17:09 Pulse 94 MON; Pulse Ox 89% ;                                                            jmb 

17:38  / 58 (auto/);                                                                    jmb 

17:39 Pulse 92 MON; Pulse Ox 91% ;                                                            jmb 

18:17  / 60; Pulse 93; Resp 22; Temp 97.8(O); Pulse Ox 91% on 4 lpm NC; Pain 0/10;      jmb 

13:24 Body Mass Index 51.21 (148.32 kg, 170.18 cm)                                            dem1

                                                                                                  

MDM:                                                                                              

13:10 Cardiac Monitor/Pulse Ox/q 30 min VS ordered.                                           br1 

13:10 IV Saline Lock ordered.                                                                 br1 

13:10 Oxygen at 4L/Min NC or Home dosage ordered.                                             br1 

13:10 Rhythm Strip to chart ordered.                                                          br1 

13:10 Undress patient appropriately for examination ordered.                                  br1 

13:12 B-Type Natiuretic Peptide Ordered.                                                      EDMS

13:12 Basic Metabolic Profile Ordered.                                                        EDMS

13:12 CBC with Diff Ordered.                                                                  EDMS

13:12 Chest, 2 View (pa\E\lat) Ordered.                                                         EDMS

13:12 ECG WITH READING ER PHYS+CARDIAG ordered.                                               EDMS

13:13 Albuterol-Ipratropium 1 neb Nebulizer every 20 minutes x3 ordered.                      br1 

13:13 Call Respiratory ordered.                                                               br1 

13:13 -Influenza A&B Rapid Antigen - Nose Ordered.                                            EDMS

13:14 Solu-MEDROL 125 mg IVP once ordered.                                                    br1 

13:14 -Arterial Blood Gas Ordered.                                                            EDMS

13:15 CARDIAC INJURY PROFILE Ordered.                                                         EDMS

13:15 TROPONIN Ordered.                                                                       EDMS

13:31 Misc. Nursing Order ordered.                                                            br1 

13:40 -Blood Culture (Adults Only), peripheral from different site, or from device/port/PICC  br1 

      etc. if present ordered.                                                                    

13:41 Lactic Acid (Gray tube on ice) Ordered.                                                 EDMS

13:42 -Blood Culture Ordered.                                                                 EDMS

14:15 -Arterial Blood Gas Reviewed.                                                           br1 

14:15 Chest, 2 View (pa\E\lat) Reviewed.                                                        br1

14:16 -Blood Culture (Adults Only), peripheral from different site, or from device/port/PICC  bcj 

      etc. if present complete.                                                                   

14:20 NS 0.9% 500 ml IV at bolus once ordered.                                                br1 

14:21 BED REQUEST+ADM ordered.                                                                EDMS

14:22 Ondansetron 4 mg IVP once ordered.                                                      br1 

14:23 Call Respiratory complete.                                                              rs6 

14:46 B-Type Natiuretic Peptide Reviewed.                                                     br1 

14:46 Basic Metabolic Profile Reviewed.                                                       br1 

14:46 CBC with Diff Reviewed.                                                                 br1 

14:46 -Influenza A&B Rapid Antigen - Nose Reviewed.                                           br1

14:46 CARDIAC INJURY PROFILE Reviewed.                                                        br1 

14:46 TROPONIN Reviewed.                                                                      br1 

14:59 Financial registration complete.                                                        hs2 

15:05 Lactic Acid (Gray tube on ice) Reviewed.                                                br1 

15:06 CT Chest Without Contrast Ordered.                                                      EDMS

15:06 Recheck B/P ordered.                                                                    br1 

15:06 cefTRIAXone 2 grams IVPB once over 30 mins; dilute in 50mL of NS or D5W ordered.        br1 

15:21 [facility] Payment Agreement was scanned into ITM Software and attached to record.               hs2 

16:47 Admission / Observation Status ordered.                                                 EDMS

16:47 CONSISTENT CARBOHYDRATES ordered.                                                       EDMS

16:49 URINALYSIS Ordered.                                                                     EDMS

16:49 BLOOD CULTURES Ordered.                                                                 EDMS

16:49 BLOOD CULTURES Ordered.                                                                 EDMS

16:51 GASTROINTESTINAL (GI) PANEL Ordered.                                                    EDMS

17:10 SPUTUM CULTURE AND GRAM STAIN Ordered.                                                  EDMS

17:32 CARDIAC MARKER PANEL Ordered.                                                           EDMS

17:34 LACTIC ACID LEVEL, LACTATE Ordered.                                                     EDMS

21:24 T-Sheet-- Draft Copy was scanned into ITM Software and attached to record.                   klr 

                                                                                                  

Administered Medications:                                                                         

13:30 Drug: Albuterol-Ipratropium 1 neb [ipratropium-albuterol 0.5 mg-3 mg(2.5 mg base)/3 mL  js11

      nebulization soln (1 neb)] Route: Nebulizer;                                                

13:50 Drug: Albuterol-Ipratropium 1 neb [ipratropium-albuterol 0.5 mg-3 mg(2.5 mg base)/3 mL  js11

      nebulization soln (1 neb)] Route: Nebulizer;                                                

14:01 Drug: Albuterol-Ipratropium 1 neb [ipratropium-albuterol 0.5 mg-3 mg(2.5 mg base)/3 mL  js11

      nebulization soln (1 neb)] Route: Nebulizer;                                                

14:16 Drug: Solu-MEDROL 125 mg [Solu-Medrol 500 mg intravenous solution (125 mg)] Route: IVP; bcj 

      Site: right forearm;                                                                        

14:55 Drug: NS 0.9% 500 ml [sodium chloride 0.9 % intravenous solution] Route: IV; Rate:      bcj 

      bolus; Site: right forearm;                                                                 

15:12 Drug: Ondansetron 4 mg [ondansetron HCl 2 mg/mL intravenous solution (2 mL)] Route:     jmb 

      IVP; Site: left forearm;                                                                    

15:38 Drug: cefTRIAXone 2 grams [ceftriaxone 1 gram solution for injection] Route: IVPB;      jmb 

      Infused Over: 30 mins; Site: left forearm;                                                  

                                                                                                  

                                                                                                  

Signatures:                                                                                       

Dispatcher Piper                           EDMS                                                 

Mark Ta, RN                      RN   Trent Rothman MD MD   br1                                                  

Tony Cardona RN                        RN   [email or web address]                                                  

Leidy Nino RN                    RN   sls2                                                 

Ivana Ace, PCA                   PCA  rs6                                                  

Roxann Peña, Reg                   Reg  hs2                                                  

TarunJessie andrea                               klr                                                  

Bill Jones                               js11                                                 

                                                                                                  

The chart was reviewed and I authenticate all verbal orders and agree with the evaluation and 
treatment provided.Corrections: (The following items were deleted from the chart)

13:15 13:12 CARDIAC INJURY PROFILE+LAB ordered. EDMS                                          EDMS

13:15 13:12 TROPONIN+LAB ordered. EDMS                                                        EDMS

16:51 16:49 GASTROINTESTINAL (GI) PANEL ordered. EDMS                                         EDMS

                                                                                                  

Attachments:                                                                                      

15:21 [facility] Payment Agreement                                                                hs2 

21:24 T-Sheet-- Draft Copy                                                                    klr 

                                                                                                  

**************************************************************************************************



*** Chart Complete ***
MTDD

## 2024-11-13 NOTE — BSMART NOTE
Comprehensive Assessment Form Part 1      Section I - Disposition    Primary Diagnosis:   Secondary Diagnosis:     The Medical Doctor to Psychiatrist conference was notcompleted.  The Medical Doctor is in agreement with Psychiatrist disposition because of (reason) Pt does not meet criteria for IP BH at this time.  The plan is resources.  The on-call Psychiatrist consulted was  .  The admitting Psychiatrist will be  .  The admitting Diagnosis is .  The Payor source is Medicaid.      BSMART assessment completed, and suicide risk level noted to be no. Primary Nurse Linsey and Physician Martha notified. Concerns not observed.     This writer reviewed the [place] Suicide Severity Rating Scale in nursing flowsheet and the risk level assigned is no risk.  Based on this assessment, the risk of suicide is no risk and the plan is resources.    Section Pt assessed face to face in II - Integrated Summary  Summary:  Pt assessed face to face in [facility] ED 9 via [facility].  Pt sitting on the side of the stretcher fully clothed.  Pt states that she has Stage 2 Breast Ca.  States she has been going thru Chemo and has 1 more treatment to complete the Chemo.  Pt states that ever since she received that Dx that she has been feeling depressed.  Pt states that her aunt and daughter are the only family members in [place] and she feels that they are not supportive.  Pt denies SI HI Hallucinations or past psych h/o.  Pt states that she feels safe to d.c home with f/u.  Resources sent to [facility] ED.      The patient has demonstrated mental capacity to provide informed consent.  The information is given by the patient.  The Chief Complaint is as above.  The Precipitant Factors are as above.  Previous Hospitalizations: denies  The patient has not previously been in restraints.  Current Psychiatrist and/or  is denies.    Lethality Assessment:    The potential for suicide noted by the following: not noted.  The potential for

## 2024-11-13 NOTE — ED TRIAGE NOTES
Patient arrives from home for feelings of hopelessness and depression. Patient denies any SI or HI. Patient reports she has stage 2 breast cancer and reports a lack of support at home.  
Waiting ambulance service

## 2024-11-13 NOTE — ED PROVIDER NOTES
[facility] EMERGENCY DEPT  EMERGENCY DEPARTMENT HISTORY AND PHYSICAL EXAM      Date: 11/13/2024  Patient Name: Sarah Foster  MRN: [id number]  YOB: 1970  Date of evaluation: 11/13/2024  Provider: Melissa Mendoza MD   Note Started: 12:40 PM EST 11/13/24    HISTORY OF PRESENT ILLNESS     Chief Complaint   Patient presents with    Depression       History Provided By: Patient    HPI: Sarah Foster is a 54 y.o. female     PAST MEDICAL HISTORY   Past Medical History:  Past Medical History:   Diagnosis Date    Breast cancer (HCC)        Past Surgical History:  No past surgical history on file.    Family History:  No family history on file.    Social History:  Social History     Tobacco Use    Smoking status: Former     Types: Cigarettes    Smokeless tobacco: Never   Vaping Use    Vaping status: Never Used   Substance Use Topics    Alcohol use: Not Currently    Drug use: Not Currently     Types: Marijuana (Weed)       Allergies:  No Known Allergies    PCP: Christin Dillard APRN - CNP    Current Meds:   No current facility-administered medications for this encounter.     Current Outpatient Medications   Medication Sig Dispense Refill    fluticasone (FLONASE) 50 MCG/ACT nasal spray 2 sprays by Nasal route daily 16 g 2    hydrOXYzine HCl (ATARAX) 25 MG tablet Take 1 tablet by mouth every 6 hours as needed for Itching 90 tablet 2       Social Determinants of Health:   Social Determinants of Health     Tobacco Use: Medium Risk (8/29/2024)    Patient History     Smoking Tobacco Use: Former     Smokeless Tobacco Use: Never     Passive Exposure: Not on file   Alcohol Use: Not At Risk (1/17/2023)    Received from [facility]    AUDIT-C     Frequency of Alcohol Consumption: Never     Average Number of Drinks: Patient does not drink     Frequency of Binge Drinking: Never   Financial Resource Strain: High Risk (7/17/2024)    Overall Financial Resource Strain (CARDIA)     Difficulty of Paying Living

## 2025-04-02 ENCOUNTER — COMMUNITY OUTREACH (OUTPATIENT)
Facility: CLINIC | Age: 55
End: 2025-04-02

## 2025-07-29 ENCOUNTER — HOSPITAL ENCOUNTER (EMERGENCY)
Facility: HOSPITAL | Age: 55
Discharge: HOME | End: 2025-07-29
Attending: EMERGENCY MEDICINE
Payer: MEDICAID

## 2025-07-29 VITALS
SYSTOLIC BLOOD PRESSURE: 136 MMHG | DIASTOLIC BLOOD PRESSURE: 68 MMHG | HEART RATE: 62 BPM | TEMPERATURE: 97.3 F | HEIGHT: 63 IN | BODY MASS INDEX: 23.39 KG/M2 | OXYGEN SATURATION: 100 % | RESPIRATION RATE: 18 BRPM | WEIGHT: 132 LBS

## 2025-07-29 DIAGNOSIS — H01.116 ALLERGIC BLEPHARITIS, LEFT: Primary | ICD-10-CM

## 2025-07-29 PROCEDURE — 99284 EMERGENCY DEPT VISIT MOD MDM: CPT

## 2025-07-29 PROCEDURE — 6370000000 HC RX 637 (ALT 250 FOR IP): Performed by: EMERGENCY MEDICINE

## 2025-07-29 PROCEDURE — 6360000002 HC RX W HCPCS: Performed by: EMERGENCY MEDICINE

## 2025-07-29 RX ORDER — ERYTHROMYCIN 5 MG/G
OINTMENT OPHTHALMIC
Qty: 1 G | Refills: 0 | Status: SHIPPED | OUTPATIENT
Start: 2025-07-29

## 2025-07-29 RX ORDER — ERYTHROMYCIN 5 MG/G
OINTMENT OPHTHALMIC ONCE
Status: COMPLETED | OUTPATIENT
Start: 2025-07-29 | End: 2025-07-29

## 2025-07-29 RX ADMIN — ERYTHROMYCIN: 5 OINTMENT OPHTHALMIC at 11:10

## 2025-07-29 RX ADMIN — LIDOCAINE HYDROCHLORIDE 1000 MG: 10 INJECTION, SOLUTION EPIDURAL; INFILTRATION; INTRACAUDAL; PERINEURAL at 11:08

## 2025-07-29 ASSESSMENT — PAIN - FUNCTIONAL ASSESSMENT
PAIN_FUNCTIONAL_ASSESSMENT: 0-10
PAIN_FUNCTIONAL_ASSESSMENT: 0-10

## 2025-07-29 ASSESSMENT — VISUAL ACUITY: OU: 1

## 2025-07-29 ASSESSMENT — PAIN SCALES - GENERAL
PAINLEVEL_OUTOF10: 0
PAINLEVEL_OUTOF10: 0

## 2025-07-29 NOTE — ED PROVIDER NOTES
[facility] EMERGENCY DEPT  EMERGENCY DEPARTMENT HISTORY AND PHYSICAL EXAM      Date: 7/29/2025  Patient Name: Sarah Foster  MRN: [id number]  YOB: 1970  Date of evaluation: 7/29/2025  Provider: Melissa Mendoza MD   Note Started: 10:52 AM EDT 7/29/25    HISTORY OF PRESENT ILLNESS     Chief Complaint   Patient presents with    Facial Swelling       History Provided By: Patient    HPI: Sarah Foster is a 55 y.o. female     PAST MEDICAL HISTORY   Past Medical History:  Past Medical History:   Diagnosis Date    Breast cancer (HCC)        Past Surgical History:  No past surgical history on file.    Family History:  No family history on file.    Social History:  Social History     Tobacco Use    Smoking status: Former     Types: Cigarettes    Smokeless tobacco: Never   Vaping Use    Vaping status: Never Used   Substance Use Topics    Alcohol use: Not Currently    Drug use: Not Currently     Types: Marijuana (Weed)       Allergies:  No Known Allergies    PCP: Christin Dillard APRN - CNP    Current Meds:   No current facility-administered medications for this encounter.     Current Outpatient Medications   Medication Sig Dispense Refill    ondansetron (ZOFRAN) 4 MG tablet Take 1 tablet by mouth 3 times daily as needed for Nausea or Vomiting 15 tablet 0    fluticasone (FLONASE) 50 MCG/ACT nasal spray 2 sprays by Nasal route daily 16 g 2    hydrOXYzine HCl (ATARAX) 25 MG tablet Take 1 tablet by mouth every 6 hours as needed for Itching 90 tablet 2       Social Determinants of Health:   Social Drivers of Health     Tobacco Use: Medium Risk (7/1/2025)    Received from [facility]    Patient History     Smoking Tobacco Use: Former     Smokeless Tobacco Use: Never     Passive Exposure: Not on file   Alcohol Use: Not At Risk (1/17/2023)    Received from [facility], [facility]    AUDIT-C     Frequency of Alcohol Consumption: Never     Average Number of Drinks: Patient does not drink     Frequency of Binge

## 2025-07-29 NOTE — ED TRIAGE NOTES
Patient presents with adriel huang eating reports facial swelling to left cheek  when she woke up and thinks she may have dust mites patient also reports she doesn't know how to care for her breast cancer wound on her right chest and would like it to be checked

## 2025-08-18 ENCOUNTER — HOSPITAL ENCOUNTER (EMERGENCY)
Facility: HOSPITAL | Age: 55
Discharge: LEFT BEFORE DISCHARGE | End: 2025-08-18
Attending: FAMILY MEDICINE
Payer: MEDICAID

## 2025-08-18 VITALS
TEMPERATURE: 97.3 F | HEART RATE: 53 BPM | DIASTOLIC BLOOD PRESSURE: 83 MMHG | SYSTOLIC BLOOD PRESSURE: 179 MMHG | HEIGHT: 63 IN | WEIGHT: 133 LBS | RESPIRATION RATE: 18 BRPM | BODY MASS INDEX: 23.57 KG/M2 | OXYGEN SATURATION: 97 %

## 2025-08-18 DIAGNOSIS — D49.3 BREAST TUMOR: Primary | ICD-10-CM

## 2025-08-18 PROCEDURE — 99282 EMERGENCY DEPT VISIT SF MDM: CPT

## 2025-08-18 ASSESSMENT — PAIN SCALES - GENERAL: PAINLEVEL_OUTOF10: 8

## 2025-08-18 ASSESSMENT — PAIN DESCRIPTION - ORIENTATION: ORIENTATION: RIGHT

## 2025-08-18 ASSESSMENT — PAIN DESCRIPTION - DESCRIPTORS: DESCRIPTORS: SHARP

## 2025-08-18 ASSESSMENT — PAIN DESCRIPTION - LOCATION: LOCATION: BREAST

## 2025-08-18 ASSESSMENT — PAIN - FUNCTIONAL ASSESSMENT: PAIN_FUNCTIONAL_ASSESSMENT: 0-10
